# Patient Record
Sex: FEMALE | Race: WHITE | NOT HISPANIC OR LATINO | Employment: FULL TIME | URBAN - METROPOLITAN AREA
[De-identification: names, ages, dates, MRNs, and addresses within clinical notes are randomized per-mention and may not be internally consistent; named-entity substitution may affect disease eponyms.]

---

## 2018-11-13 ENCOUNTER — OFFICE VISIT (OUTPATIENT)
Dept: FAMILY MEDICINE CLINIC | Facility: CLINIC | Age: 33
End: 2018-11-13
Payer: COMMERCIAL

## 2018-11-13 VITALS
BODY MASS INDEX: 27.48 KG/M2 | HEART RATE: 81 BPM | RESPIRATION RATE: 16 BRPM | WEIGHT: 171 LBS | TEMPERATURE: 99 F | OXYGEN SATURATION: 99 % | HEIGHT: 66 IN | SYSTOLIC BLOOD PRESSURE: 118 MMHG | DIASTOLIC BLOOD PRESSURE: 78 MMHG

## 2018-11-13 DIAGNOSIS — R53.83 FATIGUE, UNSPECIFIED TYPE: ICD-10-CM

## 2018-11-13 DIAGNOSIS — R10.30 LOWER ABDOMINAL PAIN: ICD-10-CM

## 2018-11-13 DIAGNOSIS — Z00.00 ENCOUNTER FOR WELL ADULT EXAM WITHOUT ABNORMAL FINDINGS: Primary | ICD-10-CM

## 2018-11-13 PROCEDURE — 99385 PREV VISIT NEW AGE 18-39: CPT | Performed by: FAMILY MEDICINE

## 2018-11-13 NOTE — PROGRESS NOTES
Esme Ventura was seen today for new patient  Diagnoses and all orders for this visit:    Encounter for well adult exam without abnormal findings    Lower abdominal pain  -     CT abdomen pelvis w contrast; Future    Fatigue, unspecified type  -     CBC and differential; Future  -     TSH, 3rd generation with Free T4 reflex; Future  -     Comprehensive metabolic panel  -     Lipid Panel with Direct LDL reflex; Future  -     Iron; Future  -     Vitamin D 25 hydroxy; Future      Patient Counseling:  --Nutrition: Stressed importance of moderation in sodium/caffeine intake, saturated fat and cholesterol, caloric balance, sufficient intake of fresh fruits, vegetables, fiber, calcium, iron, and 1 mg of folate supplement per day   --Discussed  calcium supplement, and the daily use of baby aspirin  --Exercise: Stressed the importance of regular exercise  --Substance Abuse: Discussed cessation/primary prevention of tobacco, alcohol, or other drug use; driving or other dangerous activities under the influence; availability of treatment for abuse  --Sexuality: Discussed sexually transmitted diseases, partner selection, use of condoms, avoidance of unintended pregnancy  and contraceptive alternatives  --Injury prevention: Discussed safety belts, safety helmets, smoke detector, smoking near bedding or upholstery  --Dental health: Discussed importance of regular tooth brushing, flossing, and dental visits  --Immunizations reviewed  --Discussed benefits of screening colonoscopy    Chief Complaint   Patient presents with    new patient       HPI    Patient here for a comprehensive physical exam The patient reports no problems other than lower abdominal cramping on and off for 6 months  Fatigue and tired  Works for [employer] [address] [employer] you take any herbs or supplements that were not prescribed by a doctor? no   Are you taking calcium supplements?  no   Are you taking aspirin daily? no  How often do you exercise? Not exercising regularly   Walks at work   Diet?  2-3 servings of fruits and vegetables   Dental visit:  Every 6 months     Vision test: 6-7 years ago  No vision correction needed     Colonoscopy:  Never had one done      History:  LMP: regular periods  Last pap date: 4 years ago  Abnormal pap? no  : 1  Para: 3 1years old son         History   Sexual Activity    Sexual activity: Yes    Partners: Male             Review of Systems   Constitutional: Negative  HENT: Negative  Eyes: Negative  Respiratory: Negative  Cardiovascular: Negative  Gastrointestinal: Negative  Endocrine: Negative          +fatigue   Genitourinary: Negative  Musculoskeletal: Negative  Skin: Negative  Allergic/Immunologic: Negative  Neurological: Negative  Hematological: Negative  Psychiatric/Behavioral: Negative  History reviewed  No pertinent family history  History reviewed  No pertinent past medical history  Social History     Social History    Marital status:      Spouse name: N/A    Number of children: N/A    Years of education: N/A     Occupational History    Not on file  Social History Main Topics    Smoking status: Never Smoker    Smokeless tobacco: Never Used    Alcohol use Yes      Comment: social    Drug use: No    Sexual activity: Yes     Partners: Male     Other Topics Concern    Not on file     Social History Narrative    No narrative on file       No current outpatient prescriptions on file prior to visit  No current facility-administered medications on file prior to visit  No Known Allergies      Vitals:    18 1340   BP: 118/78   BP Location: Left arm   Patient Position: Sitting   Cuff Size: Standard   Pulse: 81   Resp: 16   Temp: 99 °F (37 2 °C)   SpO2: 99%   Weight: 77 6 kg (171 lb)   Height: 5' 5 75" (1 67 m)         Physical Exam   Constitutional: She is oriented to person, place, and time   She appears well-developed and well-nourished  HENT:   Head: Normocephalic and atraumatic  Eyes: Pupils are equal, round, and reactive to light  EOM are normal    Neck: Normal range of motion  Neck supple  Cardiovascular: Normal rate and regular rhythm  Exam reveals no friction rub  No murmur heard  Pulmonary/Chest: Effort normal and breath sounds normal    Abdominal: There is tenderness  Right lower and epigastric    Musculoskeletal: Normal range of motion  She exhibits no edema or tenderness  Neurological: She is alert and oriented to person, place, and time  Skin: Skin is warm  No rash noted  No erythema  Psychiatric: She has a normal mood and affect   Her behavior is normal

## 2021-03-17 ENCOUNTER — OFFICE VISIT (OUTPATIENT)
Dept: URGENT CARE | Facility: MEDICAL CENTER | Age: 36
End: 2021-03-17
Payer: COMMERCIAL

## 2021-03-17 VITALS — TEMPERATURE: 98.3 F | OXYGEN SATURATION: 98 % | HEART RATE: 99 BPM

## 2021-03-17 DIAGNOSIS — J06.9 ACUTE URI: Primary | ICD-10-CM

## 2021-03-17 PROCEDURE — U0003 INFECTIOUS AGENT DETECTION BY NUCLEIC ACID (DNA OR RNA); SEVERE ACUTE RESPIRATORY SYNDROME CORONAVIRUS 2 (SARS-COV-2) (CORONAVIRUS DISEASE [COVID-19]), AMPLIFIED PROBE TECHNIQUE, MAKING USE OF HIGH THROUGHPUT TECHNOLOGIES AS DESCRIBED BY CMS-2020-01-R: HCPCS | Performed by: PHYSICIAN ASSISTANT

## 2021-03-17 PROCEDURE — U0005 INFEC AGEN DETEC AMPLI PROBE: HCPCS | Performed by: PHYSICIAN ASSISTANT

## 2021-03-17 PROCEDURE — 99213 OFFICE O/P EST LOW 20 MIN: CPT | Performed by: PHYSICIAN ASSISTANT

## 2021-03-17 NOTE — LETTER
March 17, 2021     Patient: Dimas Roman   YOB: 1985   Date of Visit: 3/17/2021       To Whom it May Concern:    Dimas Roman was seen in my clinic on 3/17/2021  Please excuse until test results available    If you have any questions or concerns, please don't hesitate to call           Sincerely,          Gaurav Sharma PA-C        CC: Dimas Jessie

## 2021-03-17 NOTE — PROGRESS NOTES
[facility]        NAME: Dimas Roman is a 37 y o  female  : 1985    MRN: [id number]  DATE: 2021  TIME: 3:11 PM    Assessment and Plan   Acute URI [J06 9]  1  Acute URI  [facility]Cov[facility]      COVID-19 swab performed due to symptoms  Advised to treat symptomatically and isolate until results available  Patient Instructions     Tylenol or Motrin for pain   may use OTC cough or congestion medication   isolate until results available  Follow up with PCP in 3-5 days  Proceed to  ER if symptoms worsen  Chief Complaint     Chief Complaint   Patient presents with    Sore Throat     Started yesterday sore throat, runny nose, cough, pressure in ears, neck soreness  Took musinex  History of Present Illness        Patient is a 31-year-old female who presents today with complaints of sore throat, cough, runny nose, ear pain since yesterday  Has been using Mucinex with some improvement  No known sick exposures  No fever, chills, body aches  Review of Systems   Review of Systems   Constitutional: Negative for chills and fever  HENT: Positive for congestion, ear pain, rhinorrhea and sore throat  Respiratory: Positive for cough  Negative for shortness of breath and wheezing  Cardiovascular: Negative for chest pain  Musculoskeletal: Negative for myalgias  Current Medications     No current outpatient medications on file  Current Allergies     Allergies as of 2021    (No Known Allergies)            The following portions of the patient's history were reviewed and updated as appropriate: allergies, current medications, past family history, past medical history, past social history, past surgical history and problem list      History reviewed  No pertinent past medical history      Past Surgical History:   Procedure Laterality Date    APPENDECTOMY         No family history on file       Medications have been verified  Objective   Pulse 99   Temp 98 3 °F (36 8 °C) (Temporal)   SpO2 98%        Physical Exam     Physical Exam  Constitutional:       General: She is not in acute distress  Appearance: She is well-developed and normal weight  She is not ill-appearing  HENT:      Head: Normocephalic and atraumatic  Right Ear: Tympanic membrane and ear canal normal       Left Ear: Tympanic membrane and ear canal normal       Nose: Congestion present  No rhinorrhea  Mouth/Throat:      Mouth: Mucous membranes are moist  No oral lesions  Pharynx: Oropharynx is clear  Uvula midline  Posterior oropharyngeal erythema present  No pharyngeal swelling, oropharyngeal exudate or uvula swelling  Tonsils: No tonsillar exudate  0 on the right  0 on the left  Neck:      Musculoskeletal: Neck supple  Cardiovascular:      Rate and Rhythm: Normal rate and regular rhythm  Pulmonary:      Effort: Pulmonary effort is normal       Breath sounds: Normal breath sounds  Lymphadenopathy:      Cervical: No cervical adenopathy  Skin:     General: Skin is warm and dry  Neurological:      Mental Status: She is alert

## 2021-03-18 LAB — SARS-COV-2 RNA RESP QL NAA+PROBE: NEGATIVE

## 2021-04-05 ENCOUNTER — OFFICE VISIT (OUTPATIENT)
Dept: URGENT CARE | Facility: MEDICAL CENTER | Age: 36
End: 2021-04-05
Payer: COMMERCIAL

## 2021-04-05 ENCOUNTER — HOSPITAL ENCOUNTER (EMERGENCY)
Facility: HOSPITAL | Age: 36
Discharge: HOME | End: 2021-04-06
Attending: EMERGENCY MEDICINE | Admitting: EMERGENCY MEDICINE
Payer: COMMERCIAL

## 2021-04-05 VITALS
RESPIRATION RATE: 18 BRPM | SYSTOLIC BLOOD PRESSURE: 127 MMHG | DIASTOLIC BLOOD PRESSURE: 79 MMHG | TEMPERATURE: 99.4 F | OXYGEN SATURATION: 99 % | HEART RATE: 94 BPM

## 2021-04-05 VITALS
BODY MASS INDEX: 30.53 KG/M2 | RESPIRATION RATE: 18 BRPM | OXYGEN SATURATION: 100 % | TEMPERATURE: 101.9 F | WEIGHT: 190 LBS | HEART RATE: 78 BPM | HEIGHT: 66 IN

## 2021-04-05 DIAGNOSIS — R10.31 RIGHT LOWER QUADRANT ABDOMINAL PAIN: Primary | ICD-10-CM

## 2021-04-05 DIAGNOSIS — R50.9 FEVER, UNSPECIFIED FEVER CAUSE: ICD-10-CM

## 2021-04-05 DIAGNOSIS — R10.9 ABDOMINAL PAIN: Primary | ICD-10-CM

## 2021-04-05 LAB
ALBUMIN SERPL BCP-MCNC: 3.8 G/DL (ref 3.5–5)
ALP SERPL-CCNC: 46 U/L (ref 46–116)
ALT SERPL W P-5'-P-CCNC: 20 U/L (ref 12–78)
ANION GAP SERPL CALCULATED.3IONS-SCNC: 11 MMOL/L (ref 4–13)
APTT PPP: 32 SECONDS (ref 23–37)
AST SERPL W P-5'-P-CCNC: 9 U/L (ref 5–45)
BASOPHILS # BLD AUTO: 0.03 THOUSANDS/ΜL (ref 0–0.1)
BASOPHILS NFR BLD AUTO: 0 % (ref 0–1)
BILIRUB DIRECT SERPL-MCNC: 0.18 MG/DL (ref 0–0.2)
BILIRUB SERPL-MCNC: 0.68 MG/DL (ref 0.2–1)
BUN SERPL-MCNC: 11 MG/DL (ref 5–25)
CALCIUM SERPL-MCNC: 8.2 MG/DL (ref 8.3–10.1)
CHLORIDE SERPL-SCNC: 103 MMOL/L (ref 100–108)
CO2 SERPL-SCNC: 23 MMOL/L (ref 21–32)
CREAT SERPL-MCNC: 0.83 MG/DL (ref 0.6–1.3)
EOSINOPHIL # BLD AUTO: 0.01 THOUSAND/ΜL (ref 0–0.61)
EOSINOPHIL NFR BLD AUTO: 0 % (ref 0–6)
ERYTHROCYTE [DISTWIDTH] IN BLOOD BY AUTOMATED COUNT: 12.6 % (ref 11.6–15.1)
GFR SERPL CREATININE-BSD FRML MDRD: 93 ML/MIN/1.73SQ M
GLUCOSE SERPL-MCNC: 99 MG/DL (ref 65–140)
HCT VFR BLD AUTO: 40.5 % (ref 34.8–46.1)
HGB BLD-MCNC: 13.7 G/DL (ref 11.5–15.4)
IMM GRANULOCYTES # BLD AUTO: 0.03 THOUSAND/UL (ref 0–0.2)
IMM GRANULOCYTES NFR BLD AUTO: 0 % (ref 0–2)
INR PPP: 1.25 (ref 0.84–1.19)
LACTATE SERPL-SCNC: 0.9 MMOL/L (ref 0.5–2)
LIPASE SERPL-CCNC: 115 U/L (ref 73–393)
LYMPHOCYTES # BLD AUTO: 1.11 THOUSANDS/ΜL (ref 0.6–4.47)
LYMPHOCYTES NFR BLD AUTO: 12 % (ref 14–44)
MCH RBC QN AUTO: 30.8 PG (ref 26.8–34.3)
MCHC RBC AUTO-ENTMCNC: 33.8 G/DL (ref 31.4–37.4)
MCV RBC AUTO: 91 FL (ref 82–98)
MONOCYTES # BLD AUTO: 0.49 THOUSAND/ΜL (ref 0.17–1.22)
MONOCYTES NFR BLD AUTO: 5 % (ref 4–12)
NEUTROPHILS # BLD AUTO: 8.02 THOUSANDS/ΜL (ref 1.85–7.62)
NEUTS SEG NFR BLD AUTO: 83 % (ref 43–75)
NRBC BLD AUTO-RTO: 0 /100 WBCS
PLATELET # BLD AUTO: 187 THOUSANDS/UL (ref 149–390)
PMV BLD AUTO: 10.2 FL (ref 8.9–12.7)
POTASSIUM SERPL-SCNC: 3.8 MMOL/L (ref 3.5–5.3)
PROT SERPL-MCNC: 7.4 G/DL (ref 6.4–8.2)
PROTHROMBIN TIME: 15.1 SECONDS (ref 11.6–14.5)
RBC # BLD AUTO: 4.45 MILLION/UL (ref 3.81–5.12)
SL AMB POCT URINE HCG: NEGATIVE
SODIUM SERPL-SCNC: 137 MMOL/L (ref 136–145)
WBC # BLD AUTO: 9.69 THOUSAND/UL (ref 4.31–10.16)

## 2021-04-05 PROCEDURE — 99284 EMERGENCY DEPT VISIT MOD MDM: CPT | Performed by: PHYSICIAN ASSISTANT

## 2021-04-05 PROCEDURE — 80076 HEPATIC FUNCTION PANEL: CPT | Performed by: PHYSICIAN ASSISTANT

## 2021-04-05 PROCEDURE — 96365 THER/PROPH/DIAG IV INF INIT: CPT

## 2021-04-05 PROCEDURE — 99284 EMERGENCY DEPT VISIT MOD MDM: CPT

## 2021-04-05 PROCEDURE — 85025 COMPLETE CBC W/AUTO DIFF WBC: CPT | Performed by: PHYSICIAN ASSISTANT

## 2021-04-05 PROCEDURE — 83690 ASSAY OF LIPASE: CPT | Performed by: PHYSICIAN ASSISTANT

## 2021-04-05 PROCEDURE — 81025 URINE PREGNANCY TEST: CPT | Performed by: PHYSICIAN ASSISTANT

## 2021-04-05 PROCEDURE — 83605 ASSAY OF LACTIC ACID: CPT | Performed by: PHYSICIAN ASSISTANT

## 2021-04-05 PROCEDURE — 80048 BASIC METABOLIC PNL TOTAL CA: CPT | Performed by: PHYSICIAN ASSISTANT

## 2021-04-05 PROCEDURE — 85730 THROMBOPLASTIN TIME PARTIAL: CPT | Performed by: PHYSICIAN ASSISTANT

## 2021-04-05 PROCEDURE — 99213 OFFICE O/P EST LOW 20 MIN: CPT | Performed by: PHYSICIAN ASSISTANT

## 2021-04-05 PROCEDURE — 96375 TX/PRO/DX INJ NEW DRUG ADDON: CPT

## 2021-04-05 PROCEDURE — 85610 PROTHROMBIN TIME: CPT | Performed by: PHYSICIAN ASSISTANT

## 2021-04-05 PROCEDURE — 87040 BLOOD CULTURE FOR BACTERIA: CPT | Performed by: PHYSICIAN ASSISTANT

## 2021-04-05 PROCEDURE — 36415 COLL VENOUS BLD VENIPUNCTURE: CPT | Performed by: PHYSICIAN ASSISTANT

## 2021-04-05 RX ORDER — ONDANSETRON 2 MG/ML
4 INJECTION INTRAMUSCULAR; INTRAVENOUS ONCE
Status: COMPLETED | OUTPATIENT
Start: 2021-04-05 | End: 2021-04-05

## 2021-04-05 RX ORDER — KETOROLAC TROMETHAMINE 30 MG/ML
15 INJECTION, SOLUTION INTRAMUSCULAR; INTRAVENOUS ONCE
Status: COMPLETED | OUTPATIENT
Start: 2021-04-05 | End: 2021-04-05

## 2021-04-05 RX ORDER — LORATADINE 10 MG/1
10 TABLET ORAL DAILY
COMMUNITY

## 2021-04-05 RX ADMIN — SODIUM CHLORIDE, SODIUM LACTATE, POTASSIUM CHLORIDE, AND CALCIUM CHLORIDE 1000 ML: .6; .31; .03; .02 INJECTION, SOLUTION INTRAVENOUS at 23:39

## 2021-04-05 RX ADMIN — KETOROLAC TROMETHAMINE 15 MG: 30 INJECTION, SOLUTION INTRAMUSCULAR at 23:39

## 2021-04-05 RX ADMIN — ONDANSETRON 4 MG: 2 INJECTION INTRAMUSCULAR; INTRAVENOUS at 23:39

## 2021-04-05 NOTE — Clinical Note
Edilma Justice was seen and treated in our emergency department on 4/5/2021  Diagnosis: Abdominal pain    Marco Mcmanus  may return to work on return date  She may return on this date: 04/07/2021         If you have any questions or concerns, please don't hesitate to call        Roxy García PA-C    ______________________________           _______________          _______________  Hospital Representative                              Date                                Time

## 2021-04-05 NOTE — PROGRESS NOTES
[facility]        NAME: Santosh Spivey is a 37 y o  female  : 1985    MRN: [id number]  DATE: 2021  TIME: 7:32 PM    Assessment and Plan   Right lower quadrant abdominal pain [R10 31]  1  Right lower quadrant abdominal pain  POCT urine HCG    Transfer to other facility   2  Fever, unspecified fever cause  Transfer to other facility       Concern for pelvic versus intra-abdominal process  She did have appendix removed in the past   Pregnancy test negative  Will send to ED for further evaluation and workup  Patient prefers to go to [facility]  Patient Instructions       Sent to [facility] ED for further evaluation and workup    Chief Complaint     Chief Complaint   Patient presents with    Abdominal Pain     Started yesterday with abd pain that comes and goes in severity  Loose stools   Fever     Highest recorded temp 101 8F    Generalized Body Aches     Joint Pain         History of Present Illness        Patient is a 45-year-old female who presents today with complaints of lower abdominal pain and fever that started last night  Patient reports pain in her hips as well  She has had  Appendix removed in the past   Still has uterus, tubes, ovaries  Fever has been as high as 101 8  Patient was fatigued today and did not have much of an appetite  Has only mostly drinks water  No known sick contacts  Mostly works from home  She denies any nausea, vomiting, diarrhea  Stools are soft but not loose  No blood in her stools  She denies any urinary symptoms or back or flank pain  Review of Systems   Review of Systems   Constitutional: Positive for appetite change, fatigue and fever  Negative for chills  Respiratory: Negative for shortness of breath  Cardiovascular: Negative for chest pain  Gastrointestinal: Positive for abdominal pain  Negative for blood in stool, diarrhea, nausea and vomiting     Genitourinary: Negative for difficulty urinating, flank pain and vaginal bleeding  Musculoskeletal: Positive for arthralgias  Negative for back pain  Skin: Negative for color change  Current Medications       Current Outpatient Medications:     loratadine (CLARITIN) 10 mg tablet, Take 10 mg by mouth daily, Disp: , Rfl:     Current Allergies     Allergies as of 04/05/2021    (No Known Allergies)            The following portions of the patient's history were reviewed and updated as appropriate: allergies, current medications, past family history, past medical history, past social history, past surgical history and problem list      Past Medical History:   Diagnosis Date    Allergic        Past Surgical History:   Procedure Laterality Date    APPENDECTOMY  2006       Family History   Problem Relation Age of Onset    No Known Problems Mother     No Known Problems Father          Medications have been verified  Objective   Pulse 78   Temp (!) 101 9 °F (38 8 °C)   Resp 18   Ht 5' 6" (1 676 m)   Wt 86 2 kg (190 lb)   LMP 03/17/2021   SpO2 100%   BMI 30 67 kg/m²        Physical Exam     Physical Exam  Constitutional:       General: She is not in acute distress  Appearance: She is well-developed and normal weight  She is ill-appearing  HENT:      Head: Normocephalic and atraumatic  Mouth/Throat:      Mouth: Mucous membranes are moist       Pharynx: Oropharynx is clear  Eyes:      Extraocular Movements: Extraocular movements intact  Pupils: Pupils are equal, round, and reactive to light  Cardiovascular:      Rate and Rhythm: Normal rate and regular rhythm  Pulmonary:      Effort: Pulmonary effort is normal       Breath sounds: Normal breath sounds  Abdominal:      General: Abdomen is flat  There is no distension  Palpations: Abdomen is soft  Tenderness: There is abdominal tenderness in the right lower quadrant and periumbilical area  There is guarding and rebound  There is no right CVA tenderness or left CVA tenderness  Hernia: No hernia is present  Comments:   Patient is very tender in the right lower quadrant/ pelvic area  Patient has guarding and rebound, there is some mild tenderness in the periumbilical area as well   Skin:     General: Skin is warm and dry  Neurological:      Mental Status: She is alert

## 2021-04-06 ENCOUNTER — APPOINTMENT (EMERGENCY)
Dept: CT IMAGING | Facility: HOSPITAL | Age: 36
End: 2021-04-06
Payer: COMMERCIAL

## 2021-04-06 LAB
BILIRUB UR QL STRIP: NEGATIVE
CLARITY UR: CLEAR
COLOR UR: YELLOW
EXT PREG TEST URINE: NEGATIVE
EXT. CONTROL ED NAV: NORMAL
FLUAV RNA RESP QL NAA+PROBE: NEGATIVE
FLUBV RNA RESP QL NAA+PROBE: NEGATIVE
GLUCOSE UR STRIP-MCNC: NEGATIVE MG/DL
HGB UR QL STRIP.AUTO: NEGATIVE
KETONES UR STRIP-MCNC: ABNORMAL MG/DL
LEUKOCYTE ESTERASE UR QL STRIP: NEGATIVE
NITRITE UR QL STRIP: NEGATIVE
PH UR STRIP.AUTO: 5.5 [PH]
PROT UR STRIP-MCNC: NEGATIVE MG/DL
RSV RNA RESP QL NAA+PROBE: NEGATIVE
SARS-COV-2 RNA RESP QL NAA+PROBE: NEGATIVE
SP GR UR STRIP.AUTO: 1.01 (ref 1–1.03)
UROBILINOGEN UR QL STRIP.AUTO: 0.2 E.U./DL

## 2021-04-06 PROCEDURE — 36415 COLL VENOUS BLD VENIPUNCTURE: CPT | Performed by: PHYSICIAN ASSISTANT

## 2021-04-06 PROCEDURE — 81003 URINALYSIS AUTO W/O SCOPE: CPT | Performed by: PHYSICIAN ASSISTANT

## 2021-04-06 PROCEDURE — 74177 CT ABD & PELVIS W/CONTRAST: CPT

## 2021-04-06 PROCEDURE — 87040 BLOOD CULTURE FOR BACTERIA: CPT | Performed by: PHYSICIAN ASSISTANT

## 2021-04-06 PROCEDURE — 0241U HB NFCT DS VIR RESP RNA 4 TRGT: CPT | Performed by: PHYSICIAN ASSISTANT

## 2021-04-06 RX ORDER — DICYCLOMINE HCL 20 MG
20 TABLET ORAL 2 TIMES DAILY
Qty: 20 TABLET | Refills: 0 | Status: SHIPPED | OUTPATIENT
Start: 2021-04-06 | End: 2021-08-16

## 2021-04-06 RX ORDER — DICYCLOMINE HCL 20 MG
20 TABLET ORAL ONCE
Status: COMPLETED | OUTPATIENT
Start: 2021-04-06 | End: 2021-04-06

## 2021-04-06 RX ORDER — AMOXICILLIN AND CLAVULANATE POTASSIUM 875; 125 MG/1; MG/1
1 TABLET, FILM COATED ORAL EVERY 12 HOURS SCHEDULED
Qty: 14 TABLET | Refills: 0 | Status: SHIPPED | OUTPATIENT
Start: 2021-04-06 | End: 2021-04-13

## 2021-04-06 RX ORDER — AMOXICILLIN AND CLAVULANATE POTASSIUM 875; 125 MG/1; MG/1
1 TABLET, FILM COATED ORAL ONCE
Status: COMPLETED | OUTPATIENT
Start: 2021-04-06 | End: 2021-04-06

## 2021-04-06 RX ADMIN — DICYCLOMINE HYDROCHLORIDE 20 MG: 20 TABLET ORAL at 01:51

## 2021-04-06 RX ADMIN — AMOXICILLIN AND CLAVULANATE POTASSIUM 1 TABLET: 875; 125 TABLET, FILM COATED ORAL at 01:51

## 2021-04-06 RX ADMIN — IOHEXOL 100 ML: 350 INJECTION, SOLUTION INTRAVENOUS at 00:33

## 2021-04-06 NOTE — ED PROVIDER NOTES
History  Chief Complaint   Patient presents with    Abdominal Pain     RLQ pain with low grade fever since last night      Jo Cline is a 81-year-old female arriving ambulatory to the emergency department for evaluation of generalized abdominal pain beginning yesterday and acutely worsening today  The patient additionally reports developing fever today with T-max 101 8°F oral   She describes a generalized cramping sensation predominantly throughout the mid and lower abdomen  She had been previously evaluated for these complaints this evening at a nearby urgent care facility at which time she was found have generalized abdominal tenderness to palpation with guarding especially in the right lower quadrant and had been referred to the emergency department for further evaluation  Past surgical history is notable for appendectomy and  section x1  The patient states that she did not take any antipyretic medication today and was not found to be febrile on hospital arrival   The patient states that she is urinating well and has been tolerating po intake  She denies nausea or episodes of vomiting, diarrhea or constipation  She has not noticed any blood in her stool  The patient works from home, denying any sick contact or recent illness  She denies recent travel use, recent antibiotics, or suspicious food intake  She denies back pain or flank pain  She denies abnormal vaginal bleeding or discharge, hx PID, hx STIs or concern for STIs        History provided by:  Patient  Abdominal Pain  Pain location:  Generalized  Pain quality: cramping and sharp    Pain radiates to:  Does not radiate  Pain severity:  Moderate  Onset quality:  Gradual  Duration:  1 day  Timing:  Constant  Chronicity:  New  Context: not retching, not sick contacts and not suspicious food intake    Worsened by:  Nothing  Ineffective treatments:  None tried  Associated symptoms: fever    Associated symptoms: no chills, no constipation, no cough, no diarrhea, no dysuria, no hematuria, no melena, no shortness of breath, no vaginal bleeding, no vaginal discharge and no vomiting    Risk factors: has not had multiple surgeries and not pregnant        Prior to Admission Medications   Prescriptions Last Dose Informant Patient Reported? Taking?   loratadine (CLARITIN) 10 mg tablet  Self Yes No   Sig: Take 10 mg by mouth daily      Facility-Administered Medications: None       Past Medical History:   Diagnosis Date    Allergic        Past Surgical History:   Procedure Laterality Date    APPENDECTOMY  2006       Family History   Problem Relation Age of Onset    No Known Problems Mother     No Known Problems Father      I have reviewed and agree with the history as documented  E-Cigarette/Vaping     E-Cigarette/Vaping Substances     Social History     Tobacco Use    Smoking status: Never Smoker    Smokeless tobacco: Never Used   Substance Use Topics    Alcohol use: Yes     Comment: social    Drug use: No       Review of Systems   Constitutional: Positive for fever  Negative for chills  Respiratory: Negative for cough and shortness of breath  Gastrointestinal: Positive for abdominal pain  Negative for blood in stool, constipation, diarrhea, melena and vomiting  Genitourinary: Negative for dysuria, frequency, hematuria, urgency, vaginal bleeding and vaginal discharge  All other systems reviewed and are negative  Physical Exam  Physical Exam  Vitals signs and nursing note reviewed  Constitutional:       General: She is not in acute distress  Appearance: She is well-developed  She is not ill-appearing, toxic-appearing or diaphoretic  HENT:      Head: Normocephalic and atraumatic  Eyes:      Conjunctiva/sclera: Conjunctivae normal       Pupils: Pupils are equal, round, and reactive to light  Neck:      Musculoskeletal: Normal range of motion and neck supple     Cardiovascular:      Rate and Rhythm: Normal rate and regular rhythm  Heart sounds: Normal heart sounds  Pulmonary:      Effort: Pulmonary effort is normal  No respiratory distress  Breath sounds: Normal breath sounds  No wheezing  Abdominal:      General: Bowel sounds are normal  There is no distension  Palpations: Abdomen is soft  Tenderness: There is generalized abdominal tenderness  There is no rebound  Negative signs include Ortiz's sign  Comments: Abdomen soft, nondistended  Generalized abdominal tenderness to palpation; no rigidity or peritoneal signs   Musculoskeletal: Normal range of motion  General: No tenderness  Skin:     General: Skin is warm and dry  Capillary Refill: Capillary refill takes less than 2 seconds  Coloration: Skin is not cyanotic, mottled or pale  Findings: No erythema or rash  Neurological:      General: No focal deficit present  Mental Status: She is alert  Mental status is at baseline  Sensory: Sensation is intact  No sensory deficit  Motor: Motor function is intact  No weakness           Vital Signs  ED Triage Vitals [04/05/21 2001]   Temperature Pulse Respirations Blood Pressure SpO2   99 4 °F (37 4 °C) (!) 112 20 122/67 99 %      Temp Source Heart Rate Source Patient Position - Orthostatic VS BP Location FiO2 (%)   Oral Monitor Sitting Left arm --      Pain Score       7           Vitals:    04/05/21 2001 04/05/21 2309   BP: 122/67 127/79   Pulse: (!) 112 94   Patient Position - Orthostatic VS: Sitting Sitting         Visual Acuity      ED Medications  Medications   lactated ringers bolus 1,000 mL (0 mL Intravenous Stopped 4/6/21 0039)   ketorolac (TORADOL) injection 15 mg (15 mg Intravenous Given 4/5/21 2339)   ondansetron (ZOFRAN) injection 4 mg (4 mg Intravenous Given 4/5/21 2339)   iohexol (OMNIPAQUE) 350 MG/ML injection (SINGLE-DOSE) 100 mL (100 mL Intravenous Given 4/6/21 0033)   dicyclomine (BENTYL) tablet 20 mg (20 mg Oral Given 4/6/21 0151) amoxicillin-clavulanate (AUGMENTIN) 875-125 mg per tablet 1 tablet (1 tablet Oral Given 4/6/21 0151)           Diagnostic Studies  Results Reviewed     Procedure Component Value Units Date/Time    Blood culture #1 [[id number]] Collected: 04/05/21 2323    Lab Status: Preliminary result Specimen: Blood from Arm, Right Updated: 04/07/21 0901     Blood Culture No Growth at 24 hrs  Blood culture #2 [[id number]] Collected: 04/06/21 0055    Lab Status: Preliminary result Specimen: Blood from Arm, Left Updated: 04/07/21 0901     Blood Culture No Growth at 24 hrs  COVID19, Influenza A/B, RSV PCR, [facility] [[id number]]  (Normal) Collected: 04/06/21 0104    Lab Status: Final result Specimen: Nares from Nasopharyngeal Swab Updated: 04/06/21 0146     SARS-CoV-2 Negative     INFLUENZA A PCR Negative     INFLUENZA B PCR Negative     RSV PCR Negative    Narrative: This test has been authorized by FDA under an EUA (Emergency Use Assay) for use by authorized laboratories  Clinical caution and judgement should be used with the interpretation of these results with consideration of the clinical impression and other laboratory testing  Testing reported as "Positive" or "Negative" has been proven to be accurate according to standard laboratory validation requirements  All testing is performed with control materials showing appropriate reactivity at standard intervals      POCT pregnancy, urine [[id number]]  (Normal) Resulted: 04/06/21 0132    Lab Status: Final result Updated: 04/06/21 0132     EXT PREG TEST UR (Ref: Negative) negative     Control valid    UA w Reflex to Microscopic w Reflex to Culture [[id number]]  (Abnormal) Collected: 04/06/21 0107    Lab Status: Final result Specimen: Urine, Clean Catch Updated: 04/06/21 0116     Color, UA Yellow     Clarity, UA Clear     Specific Gravity, UA 1 010     pH, UA 5 5     Leukocytes, UA Negative     Nitrite, UA Negative     Protein, UA Negative mg/dl      Glucose, UA Negative mg/dl Ketones, UA 40 (2+) mg/dl      Urobilinogen, UA 0 2 E U /dl      Bilirubin, UA Negative     Blood, UA Negative    Lactic acid [[id number]]  (Normal) Collected: 04/05/21 2323    Lab Status: Final result Specimen: Blood from Arm, Right Updated: 04/05/21 2353     LACTIC ACID 0 9 mmol/L     Narrative:      Result may be elevated if tourniquet was used during collection      Basic metabolic panel [[id number]]  (Abnormal) Collected: 04/05/21 2323    Lab Status: Final result Specimen: Blood from Arm, Right Updated: 04/05/21 2348     Sodium 137 mmol/L      Potassium 3 8 mmol/L      Chloride 103 mmol/L      CO2 23 mmol/L      ANION GAP 11 mmol/L      BUN 11 mg/dL      Creatinine 0 83 mg/dL      Glucose 99 mg/dL      Calcium 8 2 mg/dL      eGFR 93 ml/min/1 73sq m     Narrative:      [place] guidelines for Chronic Kidney Disease (CKD):     Stage 1 with normal or high GFR (GFR > 90 mL/min/1 73 square meters)    Stage 2 Mild CKD (GFR = 60-89 mL/min/1 73 square meters)    Stage 3A Moderate CKD (GFR = 45-59 mL/min/1 73 square meters)    Stage 3B Moderate CKD (GFR = 30-44 mL/min/1 73 square meters)    Stage 4 Severe CKD (GFR = 15-29 mL/min/1 73 square meters)    Stage 5 End Stage CKD (GFR <15 mL/min/1 73 square meters)  Note: GFR calculation is accurate only with a steady state creatinine    Hepatic function panel [[id number]]  (Normal) Collected: 04/05/21 2323    Lab Status: Final result Specimen: Blood from Arm, Right Updated: 04/05/21 2348     Total Bilirubin 0 68 mg/dL      Bilirubin, Direct 0 18 mg/dL      Alkaline Phosphatase 46 U/L      AST 9 U/L      ALT 20 U/L      Total Protein 7 4 g/dL      Albumin 3 8 g/dL     Lipase [[id number]]  (Normal) Collected: 04/05/21 2323    Lab Status: Final result Specimen: Blood from Arm, Right Updated: 04/05/21 2348     Lipase 115 u/L     Protime-INR [[id number]]  (Abnormal) Collected: 04/05/21 2323    Lab Status: Final result Specimen: Blood from Arm, Right Updated: 04/05/21 2346     Protime 15 1 seconds      INR 1 25    APTT [[id number]]  (Normal) Collected: 04/05/21 2323    Lab Status: Final result Specimen: Blood from Arm, Right Updated: 04/05/21 2346     PTT 32 seconds     CBC and differential [[id number]]  (Abnormal) Collected: 04/05/21 2323    Lab Status: Final result Specimen: Blood from Arm, Right Updated: 04/05/21 2330     WBC 9 69 Thousand/uL      RBC 4 45 Million/uL      Hemoglobin 13 7 g/dL      Hematocrit 40 5 %      MCV 91 fL      MCH 30 8 pg      MCHC 33 8 g/dL      RDW 12 6 %      MPV 10 2 fL      Platelets 035 Thousands/uL      nRBC 0 /100 WBCs      Neutrophils Relative 83 %      Immat GRANS % 0 %      Lymphocytes Relative 12 %      Monocytes Relative 5 %      Eosinophils Relative 0 %      Basophils Relative 0 %      Neutrophils Absolute 8 02 Thousands/µL      Immature Grans Absolute 0 03 Thousand/uL      Lymphocytes Absolute 1 11 Thousands/µL      Monocytes Absolute 0 49 Thousand/µL      Eosinophils Absolute 0 01 Thousand/µL      Basophils Absolute 0 03 Thousands/µL                  CT abdomen pelvis with contrast   Final Result by Maciej Francois MD (04/06 0112)      Multiple loops of fluid-filled small bowel throughout the abdomen and pelvis with a small amount of liquid stool within the colon  A few loops of small bowel within the left mid to lower abdomen demonstrates mild wall thickening  The findings are    suspicious for an infectious or inflammatory enteritis  Small amount of pelvic free fluid  No free intraperitoneal air  No large or small bowel obstruction  Workstation performed: [id number]                    Procedures  Procedures         ED Course                             SBIRT 22yo+      Most Recent Value   SBIRT (23 yo +)   In order to provide better care to our patients, we are screening all of our patients for alcohol and drug use  Would it be okay to ask you these screening questions?   Yes Filed at: 2021 005   Initial Alcohol Screen: US AUDIT-C    1  How often do you have a drink containing alcohol?  0 Filed at: 2021   2  How many drinks containing alcohol do you have on a typical day you are drinking? 0 Filed at: 2021   3b  FEMALE Any Age, or MALE 65+: How often do you have 4 or more drinks on one occassion? 0 Filed at: 2021   Audit-C Score  0 Filed at: 2021 5622   VERENICE: How many times in the past year have you    Used an illegal drug or used a prescription medication for non-medical reasons? Never Filed at: 2021 9460                    MDM  Number of Diagnoses or Management Options  Abdominal pain: new and requires workup  Diagnosis management comments: This is a 32yo female arriving ambulatory to the emergency department for evaluation of generalized abdominal pain and fever  Symptom onset yesterday though worsening today  Patient reports gradual development of generalized abdominal pain described as cramping in addition to fevers with T-max 101 8° F oral today  Patient had been previously evaluated for these symptoms at an urgent care center and was found have abdominal tenderness on exam predominantly in the right lower quadrant and had been referred to the emergency department for further evaluation  Past surgical history notable for appendectomy and  section x1  Patient denies nausea, vomiting, diarrhea, constipation, rectal bleeding, abnormal vaginal bleeding or discharge  She denies recent illness or sick contact  Denies recent abx use, recent travel, suspicious food intake      Differential diagnosis includes but not limited to: gastritis, gerd, pud, pancreatitis, hepatitis, acute cholecystitis, choledocholithiasis, biliary disease, enteritis, colitis, diverticulitis, obstruction, ovarian cyst, PID, UTI, pyelo; examination not clinically concerning for ovarian torsion    Initial ED plan: abdominal labs, CT abd/pelvis, UA    Final ED Assessment: Vital signs on hospital arrival notable for low grade temp 99 4F,  bpm; patient provided IV fluids, Toradol, Zofran  Examination as documented above  All labs and imaging independently reviewed with imaging interpreted by the radiologist   There were no significant lab findings, no leukocytosis, lactate within normal limits  No evidence of renal impairment or electrolyte derangement  UA without evidence of urinary tract infection  COVID testing negative  CT abdomen/pelvis reports findings suspicious for infectious or inflammatory enteritis, no large or small bowel obstruction  In setting of fever, will treat with antibiotic course Augmentin, first dose given in the ED  The patient had been updated of all lab and imaging findings, plan for hospital discharge with scripts for augmentin and bentyl, and close return precautions  I had advised the patient to follow-up with her primary care provider upon completion of antibiotic course for reassessment  Advised adequate hydration and tylenol/ibuprofen as needed for fevers  She was encouraged to return immediately with any new or worsening symptoms including but not limited to severe abdominal pain, intractable nausea or vomiting, inability to tolerate p o  Intake, or refractory fever despite antibiotic use  The patient had verbalized understanding and is agreeable with disposition plan  While being monitored in the emergency department, the patient had remained hemodynamically stable, without new or worsening symptoms  She did not experience any vomiting or episodes of diarrhea in the ED  She is stable for discharge home at this time           Amount and/or Complexity of Data Reviewed  Clinical lab tests: ordered and reviewed  Tests in the radiology section of CPT®: ordered and reviewed  Review and summarize past medical records: yes  Independent visualization of images, tracings, or specimens: yes    Risk of Complications, Morbidity, and/or Mortality  Presenting problems: moderate  Diagnostic procedures: moderate  Management options: low    Patient Progress  Patient progress: stable      Disposition  Final diagnoses:   Abdominal pain     Time reflects when diagnosis was documented in both MDM as applicable and the Disposition within this note     Time User Action Codes Description Comment    4/6/2021  1:43 AM Alberto Huizar Add [R10 9] Abdominal pain       ED Disposition     ED Disposition Condition Date/Time Comment    Discharge Stable Tue Apr 6, 2021  1:43 AM Mabelene Levels discharge to home/self care  Follow-up Information     Follow up With Specialties Details Why Contact Info Additional Information    Your Primary Care Provider   Please follow up with your PCP      [address] [place] [address] Emergency Department Emergency Medicine  If symptoms worsen [address] [place] [address] Emergency Department, [address] [place] [address], [facility]          Discharge Medication List as of 4/6/2021  1:50 AM      START taking these medications    Details   amoxicillin-clavulanate (AUGMENTIN) 875-125 mg per tablet Take 1 tablet by mouth every 12 (twelve) hours for 7 days, Starting Tue 4/6/2021, Until Tue 4/13/2021, Normal      dicyclomine (BENTYL) 20 mg tablet Take 1 tablet (20 mg total) by mouth 2 (two) times a day, Starting Tue 4/6/2021, Normal         CONTINUE these medications which have NOT CHANGED    Details   loratadine (CLARITIN) 10 mg tablet Take 10 mg by mouth daily, Historical Med           No discharge procedures on file      PDMP Review     None          ED Provider  Electronically Signed by           Mee Miramontes PA-C  04/08/21 0028

## 2021-04-06 NOTE — DISCHARGE INSTRUCTIONS
Encourage adequate hydration and rest  Please take antibiotic course as directed  Return immediately to the emergency department if you experience any new or worsening symptoms

## 2021-04-11 LAB
BACTERIA BLD CULT: NORMAL
BACTERIA BLD CULT: NORMAL

## 2021-05-03 ENCOUNTER — IMMUNIZATIONS (OUTPATIENT)
Dept: FAMILY MEDICINE CLINIC | Facility: HOSPITAL | Age: 36
End: 2021-05-03

## 2021-05-03 DIAGNOSIS — Z23 ENCOUNTER FOR IMMUNIZATION: Primary | ICD-10-CM

## 2021-05-03 PROCEDURE — 0001A SARS-COV-2 / COVID-19 MRNA VACCINE (PFIZER-BIONTECH) 30 MCG: CPT

## 2021-05-03 PROCEDURE — 91300 SARS-COV-2 / COVID-19 MRNA VACCINE (PFIZER-BIONTECH) 30 MCG: CPT

## 2021-05-24 ENCOUNTER — IMMUNIZATIONS (OUTPATIENT)
Dept: FAMILY MEDICINE CLINIC | Facility: HOSPITAL | Age: 36
End: 2021-05-24

## 2021-05-24 DIAGNOSIS — Z23 ENCOUNTER FOR IMMUNIZATION: Primary | ICD-10-CM

## 2021-05-24 PROCEDURE — 0002A SARS-COV-2 / COVID-19 MRNA VACCINE (PFIZER-BIONTECH) 30 MCG: CPT

## 2021-05-24 PROCEDURE — 91300 SARS-COV-2 / COVID-19 MRNA VACCINE (PFIZER-BIONTECH) 30 MCG: CPT

## 2021-08-16 ENCOUNTER — OFFICE VISIT (OUTPATIENT)
Dept: FAMILY MEDICINE CLINIC | Facility: CLINIC | Age: 36
End: 2021-08-16
Payer: COMMERCIAL

## 2021-08-16 VITALS
OXYGEN SATURATION: 98 % | HEIGHT: 66 IN | BODY MASS INDEX: 30.22 KG/M2 | HEART RATE: 80 BPM | WEIGHT: 188 LBS | SYSTOLIC BLOOD PRESSURE: 128 MMHG | DIASTOLIC BLOOD PRESSURE: 86 MMHG | TEMPERATURE: 97.8 F

## 2021-08-16 DIAGNOSIS — R22.0 LOCALIZED SWELLING, MASS AND LUMP, HEAD: ICD-10-CM

## 2021-08-16 DIAGNOSIS — Z11.59 ENCOUNTER FOR HEPATITIS C SCREENING TEST FOR LOW RISK PATIENT: ICD-10-CM

## 2021-08-16 DIAGNOSIS — Z83.2 FAMILY HISTORY OF AUTOIMMUNE DISORDER: ICD-10-CM

## 2021-08-16 DIAGNOSIS — Z83.49 FAMILY HISTORY OF THYROID DISEASE: ICD-10-CM

## 2021-08-16 DIAGNOSIS — Z13.220 SCREENING, LIPID: ICD-10-CM

## 2021-08-16 DIAGNOSIS — R53.83 FATIGUE, UNSPECIFIED TYPE: ICD-10-CM

## 2021-08-16 DIAGNOSIS — Z11.4 SCREENING FOR HIV (HUMAN IMMUNODEFICIENCY VIRUS): ICD-10-CM

## 2021-08-16 DIAGNOSIS — Z12.4 SCREENING FOR CERVICAL CANCER: ICD-10-CM

## 2021-08-16 DIAGNOSIS — Z00.00 HEALTHCARE MAINTENANCE: Primary | ICD-10-CM

## 2021-08-16 DIAGNOSIS — Z13.1 SCREENING FOR DIABETES MELLITUS: ICD-10-CM

## 2021-08-16 DIAGNOSIS — R20.2 TINGLING IN EXTREMITIES: ICD-10-CM

## 2021-08-16 PROCEDURE — 99385 PREV VISIT NEW AGE 18-39: CPT | Performed by: FAMILY MEDICINE

## 2021-08-16 NOTE — PROGRESS NOTES
Morgan Expose 1985 female MRN: [id number]      ASSESSMENT/PLAN  Problem List Items Addressed This Visit     None      Visit Diagnoses     Healthcare maintenance    -  Primary    Screening for cervical cancer        Relevant Orders    Ambulatory referral to Obstetrics / Gynecology    Screening for diabetes mellitus        Relevant Orders    Comprehensive metabolic panel    Screening, lipid        Relevant Orders    Lipid panel    Family history of thyroid disease        Relevant Orders    TSH, 3rd generation with Free T4 reflex    Family history of autoimmune disorder        Relevant Orders    CBC and differential    Tingling in extremities        Relevant Orders    CBC and differential    TSH, 3rd generation with Free T4 reflex    Lyme Antibody Profile with reflex to WB    Fatigue, unspecified type        Relevant Orders    CBC and differential    TSH, 3rd generation with Free T4 reflex    Lyme Antibody Profile with reflex to WB    Screening for HIV (human immunodeficiency virus)        Relevant Orders    HIV 1/2 Antigen/Antibody (4th Generation) w Reflex [facility]    Encounter for hepatitis C screening test for low risk patient        Relevant Orders    Hepatitis C antibody    Localized swelling, mass and lump, head        Relevant Orders    US head neck soft tissue        BP WNL on recheck   BMI as below   CMP + Lipids to screen for HLD, DM   CBC, TSH, Lyme to eval fatigue, pins/needles  Pap DUE -- refer to GYN  Encouraged regular dental and eye exams      Lump on head likely lymph node vs lipoma -- given change in size, will US to further evaluate     BMI Counseling: Body mass index is 30 34 kg/m²  The BMI is above normal  Nutrition recommendations include 3-5 servings of fruits/vegetables daily  Exercise recommendations include exercising 3-5 times per week  No future appointments         SUBJECTIVE  CC: Establish Care and Lump on head (On right side of head, has had for some time but has changed shape)      HPI:  Virl Sandifer is a 37 y o  female who presents to establish care  History reviewed and updated as below  Lump on L side of head -- has been present for years   Not painful   Within the past year, has changed shape -- wider  Firm, non-mobile     Review of Systems   Constitutional: Positive for fatigue  Negative for unexpected weight change  HENT: Negative for congestion, ear pain, rhinorrhea and sore throat  Eyes: Negative for visual disturbance  Respiratory: Negative for cough and shortness of breath  Cardiovascular: Negative for chest pain (every once in awhile when she turns a certain way), palpitations (every now and again feels a skip "pumps weird") and leg swelling  Gastrointestinal: Positive for abdominal pain (intermittent -- in ED in 2021 -- can't figure out what foods trigger it)  Negative for constipation and diarrhea  Endocrine: Negative for polyuria  Genitourinary: Negative for dysuria and menstrual problem  Musculoskeletal:        Lump on back of head   Neurological: Negative for dizziness and headaches  Moments throughout the day when she feels fatigued and "tingly" and "clumsy" (has difficulty doing things) -- lasts for a few hours and then is fine the next day; not even once per week   Psychiatric/Behavioral: Negative for sleep disturbance         Historical Information   The patient history was reviewed and updated as follows:    Past Medical History:   Diagnosis Date    Allergic      Past Surgical History:   Procedure Laterality Date    APPENDECTOMY  2006     SECTION      WISDOM TOOTH EXTRACTION       Family History   Problem Relation Age of Onset    No Known Problems Mother     No Known Problems Father     Autoimmune disease Family     Thyroid disease Family       Social History   Social History     Substance and Sexual Activity   Alcohol Use Yes    Comment: 1x/week     Social History     Substance and Sexual Activity   Drug Use No     Social History     Tobacco Use   Smoking Status Never Smoker   Smokeless Tobacco Never Used       Medications:     Current Outpatient Medications:     loratadine (CLARITIN) 10 mg tablet, Take 10 mg by mouth daily, Disp: , Rfl:   No Known Allergies    OBJECTIVE    Vitals:   Vitals:    08/16/21 1430 08/16/21 1451   BP: 141/100 128/86   Pulse: 80    Temp: 97 8 °F (36 6 °C)    SpO2: 98%    Weight: 85 3 kg (188 lb)    Height: 5' 6" (1 676 m)            Physical Exam  Vitals and nursing note reviewed  Constitutional:       General: She is not in acute distress  Appearance: Normal appearance  HENT:      Head: Normocephalic and atraumatic  Comments: Approximately 1 5 cm distinct mass, firm, semi-mobile, non-tender to palpation with no overlying skin changes      Right Ear: Tympanic membrane and ear canal normal       Left Ear: Tympanic membrane and ear canal normal       Nose: Nose normal       Mouth/Throat:      Mouth: Mucous membranes are moist       Pharynx: No oropharyngeal exudate or posterior oropharyngeal erythema  Eyes:      Conjunctiva/sclera: Conjunctivae normal    Cardiovascular:      Rate and Rhythm: Normal rate and regular rhythm  Pulmonary:      Effort: Pulmonary effort is normal  No respiratory distress  Breath sounds: Normal breath sounds  Abdominal:      General: Bowel sounds are normal  There is no distension  Palpations: Abdomen is soft  Tenderness: There is no abdominal tenderness  Musculoskeletal:      Right lower leg: No edema  Left lower leg: No edema  Lymphadenopathy:      Cervical: No cervical adenopathy  Skin:     General: Skin is warm and dry  Neurological:      General: No focal deficit present  Mental Status: She is alert     Psychiatric:         Mood and Affect: Mood normal                     DO Rory Rasheed [address] [facility]   8/16/2021  2:52 PM

## 2022-04-12 ENCOUNTER — OFFICE VISIT (OUTPATIENT)
Dept: OBGYN CLINIC | Facility: CLINIC | Age: 37
End: 2022-04-12
Payer: COMMERCIAL

## 2022-04-12 VITALS
DIASTOLIC BLOOD PRESSURE: 64 MMHG | HEIGHT: 66 IN | WEIGHT: 192 LBS | SYSTOLIC BLOOD PRESSURE: 124 MMHG | BODY MASS INDEX: 30.86 KG/M2

## 2022-04-12 DIAGNOSIS — Z11.51 SCREENING FOR HUMAN PAPILLOMAVIRUS (HPV): ICD-10-CM

## 2022-04-12 DIAGNOSIS — Z01.419 WELL WOMAN EXAM WITH ROUTINE GYNECOLOGICAL EXAM: Primary | ICD-10-CM

## 2022-04-12 DIAGNOSIS — Z12.4 SCREENING FOR MALIGNANT NEOPLASM OF CERVIX: ICD-10-CM

## 2022-04-12 PROCEDURE — S0610 ANNUAL GYNECOLOGICAL EXAMINA: HCPCS | Performed by: OBSTETRICS & GYNECOLOGY

## 2022-04-12 PROCEDURE — G0124 SCREEN C/V THIN LAYER BY MD: HCPCS | Performed by: PATHOLOGY

## 2022-04-12 PROCEDURE — G0145 SCR C/V CYTO,THINLAYER,RESCR: HCPCS | Performed by: PATHOLOGY

## 2022-04-12 PROCEDURE — G0476 HPV COMBO ASSAY CA SCREEN: HCPCS | Performed by: OBSTETRICS & GYNECOLOGY

## 2022-04-12 NOTE — PROGRESS NOTES
ASSESSMENT & PLAN:   Diagnoses and all orders for this visit:    Well woman exam with routine gynecological exam  -     Liquid-based pap, screening    Screening for malignant neoplasm of cervix  -     Liquid-based pap, screening    Screening for human papillomavirus (HPV)  -     Liquid-based pap, screening          The following were reviewed in today's visit: ASCCP guidelines, Gardisil vaccination, STD testing breast self exam, STD testing, family planning choices, exercise and healthy diet  Patient to return to office in yearly for annual exam      All questions have been answered to her satisfaction  CC:  Annual Gynecologic Examination  Chief Complaint   Patient presents with    Gynecologic Exam     No pap on file? Mammo/Colonoscopy/Dexa not indicated   Establish Care       HPI: Al Landau is a 31 y o   who presents for annual gynecologic examination  She has the following concerns:    - new patient  Establish gyn care  Hasn't seen gyn in about 6 years since her son was born  Denies any abnormal pap smears in the past  She reports regular cycles but has noticed increased mood changes and cramping in the 2 weeks prior to her menses  She denies dyspareunia and feels safe at home  Mireya Nguyễn works for [employer] with [employer]  She was working from home for 2 years and is now going back into the office 3x a week         Health Maintenance:    Exercise: frequently  Breast exams/breast awareness: yes  Diet: well balanced diet      Past Medical History:   Diagnosis Date    Allergic     Varicella        Past Surgical History:   Procedure Laterality Date    APPENDECTOMY  2006     SECTION      WISDOM TOOTH EXTRACTION         Past OB/Gyn History:  Period Cycle (Days): 28  Period Duration (Days): 3  Period Pattern: Regular  Menstrual Flow: Moderate  Dysmenorrhea: (!) Mild (worse 2wks before her period)  Dysmenorrhea Symptoms: Cramping,Headache,NauseaPatient's last menstrual period was 04/07/2022 (exact date)  Last Pap: unavailable for review  History of abnormal Pap smear: no  HPV vaccine completed: no    Patient is currently sexually active  STD testing: no  Current contraception: none      Family History  Family History   Problem Relation Age of Onset    No Known Problems Mother     No Known Problems Father     Autoimmune disease Family     Thyroid disease Family        Family history of uterine or ovarian cancer: no  Family history of breast cancer: no  Family history of colon cancer: no    Social History:  Social History     Socioeconomic History    Marital status:      Spouse name: Not on file    Number of children: Not on file    Years of education: Not on file    Highest education level: Not on file   Occupational History    Not on file   Tobacco Use    Smoking status: Never Smoker    Smokeless tobacco: Never Used   Vaping Use    Vaping Use: Never used   Substance and Sexual Activity    Alcohol use: Yes     Comment: 1x/week    Drug use: No    Sexual activity: Yes     Partners: Male     Birth control/protection: None   Other Topics Concern    Not on file   Social History Narrative    Lives with son, ex-/boyfriend, dog    Works for [employer] ([employer])      Social Determinants of Health     Financial Resource Strain: Not on file   Food Insecurity: Not on file   Transportation Needs: Not on file   Physical Activity: Not on file   Stress: Not on file   Social Connections: Not on file   Intimate Partner Violence: Not on file   Housing Stability: Not on file     Domestic violence screen: negative    Allergies:  No Known Allergies    Medications:    Current Outpatient Medications:     loratadine (CLARITIN) 10 mg tablet, Take 10 mg by mouth daily, Disp: , Rfl:     Review of Systems:  Review of Systems   Constitutional: Negative for activity change, appetite change and unexpected weight change     Respiratory: Negative for cough and shortness of breath  Cardiovascular: Negative for chest pain  Gastrointestinal: Negative for abdominal pain, constipation, diarrhea, nausea and vomiting  Genitourinary: Negative for difficulty urinating, dyspareunia, frequency, menstrual problem, pelvic pain, urgency, vaginal bleeding, vaginal discharge and vaginal pain  Musculoskeletal: Negative for back pain  Skin: Negative  Neurological: Negative for dizziness, weakness, light-headedness and headaches  Psychiatric/Behavioral: Negative  Physical Exam:  /64   Ht 5' 6" (1 676 m)   Wt 87 1 kg (192 lb)   LMP 04/07/2022 (Exact Date)   BMI 30 99 kg/m²    Physical Exam  Constitutional:       General: She is not in acute distress  Appearance: Normal appearance  She is well-developed and normal weight  She is not diaphoretic  Genitourinary:      Vulva and bladder normal       No lesions in the vagina  Genitourinary Comments: Perineum normal in appearance, no lacerations, no ulcerations, no lesions visualized  Right Labia: No rash, tenderness or lesions  Left Labia: No tenderness, lesions or rash  No inguinal adenopathy present in the right or left side  No vaginal discharge, erythema, tenderness or bleeding  No vaginal prolapse present  No vaginal atrophy present  Right Adnexa: not tender, not full and no mass present  Left Adnexa: not tender, not full and no mass present  Cervix is nulliparous  No cervical motion tenderness, discharge, friability, lesion or polyp  No parametrium nodularity or thickening present  Uterus is not enlarged or tender  No uterine mass detected  Uterus is midaxial       No urethral prolapse or mass present  Bladder is not tender  Pelvic exam was performed with patient in the lithotomy position  Rectum:      No tenderness or external hemorrhoid     Breasts: Breasts are symmetrical       Right: No swelling, bleeding, mass, skin change or tenderness  Left: No swelling, bleeding, mass, skin change or tenderness  HENT:      Head: Normocephalic and atraumatic  Neck:      Thyroid: No thyromegaly or thyroid tenderness  Cardiovascular:      Rate and Rhythm: Normal rate and regular rhythm  Heart sounds: Normal heart sounds  No murmur heard  No friction rub  Pulmonary:      Effort: Pulmonary effort is normal  No respiratory distress  Breath sounds: Normal breath sounds  No wheezing or rales  Abdominal:      Palpations: Abdomen is soft  There is no mass  Tenderness: There is no abdominal tenderness  There is no guarding  Musculoskeletal:         General: No tenderness  Normal range of motion  Right lower leg: No edema  Left lower leg: No edema  Lymphadenopathy:      Lower Body: No right inguinal adenopathy  No left inguinal adenopathy  Neurological:      Mental Status: She is alert and oriented to person, place, and time  Skin:     General: Skin is warm and dry  Coloration: Skin is not pale  Findings: No erythema  Psychiatric:         Mood and Affect: Mood normal          Behavior: Behavior normal          Thought Content: Thought content normal          Judgment: Judgment normal    Vitals and nursing note reviewed

## 2022-04-14 ENCOUNTER — OFFICE VISIT (OUTPATIENT)
Dept: FAMILY MEDICINE CLINIC | Facility: CLINIC | Age: 37
End: 2022-04-14
Payer: COMMERCIAL

## 2022-04-14 VITALS
TEMPERATURE: 99.4 F | HEART RATE: 83 BPM | HEIGHT: 66 IN | BODY MASS INDEX: 30.86 KG/M2 | OXYGEN SATURATION: 96 % | SYSTOLIC BLOOD PRESSURE: 118 MMHG | DIASTOLIC BLOOD PRESSURE: 70 MMHG | WEIGHT: 192 LBS

## 2022-04-14 DIAGNOSIS — R41.840 DIFFICULTY CONCENTRATING: ICD-10-CM

## 2022-04-14 DIAGNOSIS — R41.89 BRAIN FOG: ICD-10-CM

## 2022-04-14 DIAGNOSIS — R53.83 OTHER FATIGUE: Primary | ICD-10-CM

## 2022-04-14 DIAGNOSIS — M25.50 ARTHRALGIA, UNSPECIFIED JOINT: ICD-10-CM

## 2022-04-14 PROCEDURE — 99214 OFFICE O/P EST MOD 30 MIN: CPT | Performed by: FAMILY MEDICINE

## 2022-04-14 PROCEDURE — 3008F BODY MASS INDEX DOCD: CPT | Performed by: FAMILY MEDICINE

## 2022-04-14 NOTE — PROGRESS NOTES
John Mitchell 1985 female MRN: [id number]      ASSESSMENT/PLAN  Problem List Items Addressed This Visit     None      Visit Diagnoses     Other fatigue    -  Primary    Relevant Orders    RF Screen w/ Reflex to Titer    C-reactive protein    Vitamin D 25 hydroxy    MISCELLANEOUS LAB TEST    Difficulty concentrating        Relevant Orders    RF Screen w/ Reflex to Titer    C-reactive protein    Vitamin D 25 hydroxy    MISCELLANEOUS LAB TEST    Brain fog        Relevant Orders    RF Screen w/ Reflex to Titer    C-reactive protein    Vitamin D 25 hydroxy    MISCELLANEOUS LAB TEST    Arthralgia, unspecified joint        Relevant Orders    RF Screen w/ Reflex to Titer    C-reactive protein    Vitamin D 25 hydroxy    MISCELLANEOUS LAB TEST        Wide differential for constellation of symptoms including anemia, thyroid dysfunction, autoimmune or rheumatologic issue, Lyme disease, vitamin deficiency  Encouraged to complete labs as previously ordered in addition to labs ordered today  Will determine next steps pending results  If labs benign, pt may benefit from sleep eval given reports of disrupted sleep  Future Appointments   Date Time Provider Jessi Lane   4/17/2023  1:00 PM Svetlana Stern MD [facility]          SUBJECTIVE  CC: Fatigue ([id number]), brain fog ([id number]), and hard time focusing      HPI:  John Mitchell is a 31 y o  female who presents due to multiple concerns as detailed below       For at least 6 months, but worsening over the past few months:   Having difficulty focusing, feeling fatigue, brain fog a lot, getting very overwhelmed very easily and shutting down   She is trying to get 8 hours of sleep -- does sleep lightly, so has interrupted sleep   Started taking her temperature daily -- was reading 99 every day; feels like she is warm, but cold at the same time -- hands get very cold and difficult to move, used a heated blanket arlyn Hackett for work about 3 times per year; is splitting time at home and in office for work   Had flu in December     Review of Systems   Constitutional: Positive for fatigue  Denies hair, skin, nails changes    Gastrointestinal: Positive for abdominal distention and abdominal pain (constantly hurts -- has been present her "entire" life)  Negative for constipation and diarrhea  Otherwise regular BMs   Endocrine: Negative for cold intolerance and heat intolerance  Musculoskeletal: Positive for back pain, joint swelling (fingers/hands/ankles/feet swell with acitivty) and neck pain (especially L, has decreased ROM)  Intermittently since December    Skin: Positive for color change (blotchy hands with activity)  Neurological: Positive for tremors (shakey in her body like she needs to eat something) and light-headedness (sometimes randomly )  Psychiatric/Behavioral: Positive for decreased concentration and sleep disturbance         Historical Information   The patient history was reviewed and updated as follows:    Past Medical History:   Diagnosis Date    Allergic     Varicella      Past Surgical History:   Procedure Laterality Date    APPENDECTOMY  2006     SECTION      WISDOM TOOTH EXTRACTION       Family History   Problem Relation Age of Onset    No Known Problems Mother     No Known Problems Father     Autoimmune disease Family     Thyroid disease Family       Social History   Social History     Substance and Sexual Activity   Alcohol Use Yes    Comment: 1x/week     Social History     Substance and Sexual Activity   Drug Use No     Social History     Tobacco Use   Smoking Status Never Smoker   Smokeless Tobacco Never Used       Medications:     Current Outpatient Medications:     loratadine (CLARITIN) 10 mg tablet, Take 10 mg by mouth daily, Disp: , Rfl:   No Known Allergies    OBJECTIVE    Vitals:   Vitals:    22 1538   BP: 118/70   BP Location: Left arm   Patient Position: Sitting   Cuff Size: Large   Pulse: 83   Temp: 99 4 °F (37 4 °C)   SpO2: 96%   Weight: 87 1 kg (192 lb)   Height: 5' 6" (1 676 m)           Physical Exam  Vitals and nursing note reviewed  Constitutional:       General: She is not in acute distress  Appearance: Normal appearance  HENT:      Head: Normocephalic and atraumatic  Pulmonary:      Effort: Pulmonary effort is normal  No respiratory distress  Neurological:      General: No focal deficit present  Mental Status: She is alert     Psychiatric:         Mood and Affect: Mood normal                     DO Rory Rasheed [address] [facility]   4/14/2022  4:00 PM

## 2022-04-15 LAB
HPV HR 12 DNA CVX QL NAA+PROBE: NEGATIVE
HPV16 DNA CVX QL NAA+PROBE: NEGATIVE
HPV18 DNA CVX QL NAA+PROBE: NEGATIVE

## 2022-04-18 NOTE — RESULT ENCOUNTER NOTE
Arthur Garland,     Your HPV testing is negative  Your pap is still pending, and will be updated soon  Please contact the office at [phone] with any questions       Markie
Never

## 2022-04-21 LAB
LAB AP GYN PRIMARY INTERPRETATION: NORMAL
Lab: NORMAL
PATH INTERP SPEC-IMP: NORMAL

## 2022-04-23 ENCOUNTER — APPOINTMENT (OUTPATIENT)
Dept: LAB | Facility: CLINIC | Age: 37
End: 2022-04-23
Payer: COMMERCIAL

## 2022-04-23 DIAGNOSIS — R41.840 DIFFICULTY CONCENTRATING: ICD-10-CM

## 2022-04-23 DIAGNOSIS — Z11.4 SCREENING FOR HIV (HUMAN IMMUNODEFICIENCY VIRUS): ICD-10-CM

## 2022-04-23 DIAGNOSIS — Z83.2 FAMILY HISTORY OF AUTOIMMUNE DISORDER: ICD-10-CM

## 2022-04-23 DIAGNOSIS — R20.2 TINGLING IN EXTREMITIES: ICD-10-CM

## 2022-04-23 DIAGNOSIS — Z13.220 SCREENING, LIPID: ICD-10-CM

## 2022-04-23 DIAGNOSIS — Z13.1 SCREENING FOR DIABETES MELLITUS: ICD-10-CM

## 2022-04-23 DIAGNOSIS — Z11.59 ENCOUNTER FOR HEPATITIS C SCREENING TEST FOR LOW RISK PATIENT: ICD-10-CM

## 2022-04-23 DIAGNOSIS — R41.89 BRAIN FOG: ICD-10-CM

## 2022-04-23 DIAGNOSIS — Z83.49 FAMILY HISTORY OF THYROID DISEASE: ICD-10-CM

## 2022-04-23 DIAGNOSIS — R53.83 OTHER FATIGUE: ICD-10-CM

## 2022-04-23 DIAGNOSIS — M25.50 ARTHRALGIA, UNSPECIFIED JOINT: ICD-10-CM

## 2022-04-23 DIAGNOSIS — R53.83 FATIGUE, UNSPECIFIED TYPE: ICD-10-CM

## 2022-04-23 LAB
25(OH)D3 SERPL-MCNC: 25.5 NG/ML (ref 30–100)
ALBUMIN SERPL BCP-MCNC: 3.9 G/DL (ref 3.5–5)
ALP SERPL-CCNC: 48 U/L (ref 46–116)
ALT SERPL W P-5'-P-CCNC: 34 U/L (ref 12–78)
ANION GAP SERPL CALCULATED.3IONS-SCNC: 4 MMOL/L (ref 4–13)
AST SERPL W P-5'-P-CCNC: 21 U/L (ref 5–45)
BASOPHILS # BLD AUTO: 0.08 THOUSANDS/ΜL (ref 0–0.1)
BASOPHILS NFR BLD AUTO: 1 % (ref 0–1)
BILIRUB SERPL-MCNC: 0.53 MG/DL (ref 0.2–1)
BUN SERPL-MCNC: 11 MG/DL (ref 5–25)
CALCIUM SERPL-MCNC: 8.8 MG/DL (ref 8.3–10.1)
CHLORIDE SERPL-SCNC: 106 MMOL/L (ref 100–108)
CHOLEST SERPL-MCNC: 149 MG/DL
CO2 SERPL-SCNC: 27 MMOL/L (ref 21–32)
CREAT SERPL-MCNC: 0.78 MG/DL (ref 0.6–1.3)
CRP SERPL QL: 6.6 MG/L
EOSINOPHIL # BLD AUTO: 0.28 THOUSAND/ΜL (ref 0–0.61)
EOSINOPHIL NFR BLD AUTO: 4 % (ref 0–6)
ERYTHROCYTE [DISTWIDTH] IN BLOOD BY AUTOMATED COUNT: 12.4 % (ref 11.6–15.1)
GFR SERPL CREATININE-BSD FRML MDRD: 99 ML/MIN/1.73SQ M
GLUCOSE P FAST SERPL-MCNC: 93 MG/DL (ref 65–99)
HCT VFR BLD AUTO: 41.7 % (ref 34.8–46.1)
HCV AB SER QL: NORMAL
HDLC SERPL-MCNC: 63 MG/DL
HGB BLD-MCNC: 13.8 G/DL (ref 11.5–15.4)
IMM GRANULOCYTES # BLD AUTO: 0.01 THOUSAND/UL (ref 0–0.2)
IMM GRANULOCYTES NFR BLD AUTO: 0 % (ref 0–2)
LDLC SERPL CALC-MCNC: 67 MG/DL (ref 0–100)
LYMPHOCYTES # BLD AUTO: 2.34 THOUSANDS/ΜL (ref 0.6–4.47)
LYMPHOCYTES NFR BLD AUTO: 32 % (ref 14–44)
MCH RBC QN AUTO: 30.5 PG (ref 26.8–34.3)
MCHC RBC AUTO-ENTMCNC: 33.1 G/DL (ref 31.4–37.4)
MCV RBC AUTO: 92 FL (ref 82–98)
MONOCYTES # BLD AUTO: 0.53 THOUSAND/ΜL (ref 0.17–1.22)
MONOCYTES NFR BLD AUTO: 7 % (ref 4–12)
NEUTROPHILS # BLD AUTO: 4.15 THOUSANDS/ΜL (ref 1.85–7.62)
NEUTS SEG NFR BLD AUTO: 56 % (ref 43–75)
NONHDLC SERPL-MCNC: 86 MG/DL
NRBC BLD AUTO-RTO: 0 /100 WBCS
PLATELET # BLD AUTO: 114 THOUSANDS/UL (ref 149–390)
PMV BLD AUTO: 10.8 FL (ref 8.9–12.7)
POTASSIUM SERPL-SCNC: 4.3 MMOL/L (ref 3.5–5.3)
PROT SERPL-MCNC: 7.5 G/DL (ref 6.4–8.2)
RBC # BLD AUTO: 4.52 MILLION/UL (ref 3.81–5.12)
SODIUM SERPL-SCNC: 137 MMOL/L (ref 136–145)
TRIGL SERPL-MCNC: 94 MG/DL
TSH SERPL DL<=0.05 MIU/L-ACNC: 2.69 UIU/ML (ref 0.45–4.5)
WBC # BLD AUTO: 7.39 THOUSAND/UL (ref 4.31–10.16)

## 2022-04-23 PROCEDURE — 80061 LIPID PANEL: CPT

## 2022-04-23 PROCEDURE — 86038 ANTINUCLEAR ANTIBODIES: CPT

## 2022-04-23 PROCEDURE — 80053 COMPREHEN METABOLIC PANEL: CPT

## 2022-04-23 PROCEDURE — 84443 ASSAY THYROID STIM HORMONE: CPT

## 2022-04-23 PROCEDURE — 86803 HEPATITIS C AB TEST: CPT

## 2022-04-23 PROCEDURE — 86618 LYME DISEASE ANTIBODY: CPT

## 2022-04-23 PROCEDURE — 36415 COLL VENOUS BLD VENIPUNCTURE: CPT

## 2022-04-23 PROCEDURE — 82306 VITAMIN D 25 HYDROXY: CPT

## 2022-04-23 PROCEDURE — 86140 C-REACTIVE PROTEIN: CPT

## 2022-04-23 PROCEDURE — 87389 HIV-1 AG W/HIV-1&-2 AB AG IA: CPT

## 2022-04-23 PROCEDURE — 86430 RHEUMATOID FACTOR TEST QUAL: CPT

## 2022-04-23 PROCEDURE — 85025 COMPLETE CBC W/AUTO DIFF WBC: CPT

## 2022-04-24 PROBLEM — E55.9 VITAMIN D DEFICIENCY: Status: ACTIVE | Noted: 2022-04-24

## 2022-04-24 LAB — HIV 1+2 AB+HIV1 P24 AG SERPL QL IA: NORMAL

## 2022-04-25 LAB
ANA TITR SER IF: NEGATIVE {TITER}
RHEUMATOID FACT SER QL LA: NEGATIVE

## 2022-04-26 LAB — B BURGDOR IGG+IGM SER-ACNC: 20

## 2022-08-18 ENCOUNTER — OFFICE VISIT (OUTPATIENT)
Dept: URGENT CARE | Facility: CLINIC | Age: 37
End: 2022-08-18
Payer: COMMERCIAL

## 2022-08-18 VITALS
TEMPERATURE: 98.5 F | HEART RATE: 70 BPM | RESPIRATION RATE: 20 BRPM | DIASTOLIC BLOOD PRESSURE: 62 MMHG | SYSTOLIC BLOOD PRESSURE: 110 MMHG

## 2022-08-18 DIAGNOSIS — G44.209 TENSION HEADACHE: Primary | ICD-10-CM

## 2022-08-18 PROCEDURE — 99213 OFFICE O/P EST LOW 20 MIN: CPT | Performed by: PHYSICIAN ASSISTANT

## 2022-08-18 RX ORDER — NAPROXEN 500 MG/1
500 TABLET ORAL 2 TIMES DAILY WITH MEALS
Qty: 30 TABLET | Refills: 0 | Status: SHIPPED | OUTPATIENT
Start: 2022-08-18

## 2022-08-18 RX ORDER — BUTALBITAL, ACETAMINOPHEN AND CAFFEINE 300; 40; 50 MG/1; MG/1; MG/1
1-2 CAPSULE ORAL 3 TIMES DAILY PRN
Qty: 30 CAPSULE | Refills: 0 | Status: SHIPPED | OUTPATIENT
Start: 2022-08-18

## 2022-08-18 NOTE — PATIENT INSTRUCTIONS
1  Go to the ER immediately for any worsening symptoms  2  Follow-up with primary care in 5-7 days for any persistent symptoms  3  Increase oral fluids  4  Use the Naprosyn for baseline symptom control  For any symptoms that are not controlled by the Naprosyn, use the Fioricet as prescribed

## 2022-08-18 NOTE — PROGRESS NOTES
[facility]        NAME: Elie Dye is a 31 y o  female  : 1985    MRN: [id number]  DATE: 2022  TIME: 3:06 PM    Assessment and Plan   Tension headache [G44 209]  1  Tension headache  naproxen (Naprosyn) 500 mg tablet    Butalbital-APAP-Caffeine (Fioricet) -40 MG CAPS         Patient Instructions     1  Go to the ER immediately for any worsening symptoms  2  Follow-up with primary care in 5-7 days for any persistent symptoms  3  Increase oral fluids  4  Use the Naprosyn for baseline symptom control  For any symptoms that are not controlled by the Naprosyn, use the Fioricet as prescribed  Chief Complaint     Chief Complaint   Patient presents with    Headache     Started one week ago with headache  Went away overnight but returned and has continued since then  Hands feel achey  Feet swelling for a few days  States it is on top of her head and in the back above the neck  Taking no meds  History of Present Illness       30-year-old female patient with a 1 week history of 7/10, pressure type headache which she said originates at the base of her skull and wraps around her head in a bandlike fashion  She states that the symptoms have been constant, not worse not better  Patient states she is somewhat photophobic with the symptoms  She has mild nausea without vomiting  She denies any skin to visual changes  She denies any neck or head injuries  Patient has not had similar headaches in the past   She states the symptoms are slightly worse with head and neck movement  Review of Systems   Review of Systems   Constitutional: Negative for chills and fever  HENT: Negative for ear pain and sore throat  Eyes: Negative for pain and visual disturbance  Respiratory: Negative for cough and shortness of breath  Cardiovascular: Negative for chest pain and palpitations  Gastrointestinal: Negative for abdominal pain and vomiting     Genitourinary: Negative for dysuria and hematuria  Musculoskeletal: Negative for arthralgias and back pain  Skin: Negative for color change and rash  Neurological: Positive for headaches  Negative for seizures and syncope  All other systems reviewed and are negative  Current Medications       Current Outpatient Medications:     Butalbital-APAP-Caffeine (Fioricet) -40 MG CAPS, Take 1-2 capsules by mouth 3 (three) times a day as needed (Headache), Disp: 30 capsule, Rfl: 0    loratadine (CLARITIN) 10 mg tablet, Take 10 mg by mouth daily, Disp: , Rfl:     naproxen (Naprosyn) 500 mg tablet, Take 1 tablet (500 mg total) by mouth 2 (two) times a day with meals, Disp: 30 tablet, Rfl: 0    Current Allergies     Allergies as of 2022    (No Known Allergies)            The following portions of the patient's history were reviewed and updated as appropriate: allergies, current medications, past family history, past medical history, past social history, past surgical history and problem list      Past Medical History:   Diagnosis Date    Allergic     Varicella        Past Surgical History:   Procedure Laterality Date    APPENDECTOMY  2006     SECTION      WISDOM TOOTH EXTRACTION         Family History   Problem Relation Age of Onset    No Known Problems Mother     No Known Problems Father     Autoimmune disease Family     Thyroid disease Family          Medications have been verified  Objective   /62 (Patient Position: Sitting)   Pulse 70   Temp 98 5 °F (36 9 °C) (Temporal)   Resp 20        Physical Exam     Physical Exam  Constitutional:       Appearance: Normal appearance  HENT:      Head: Normocephalic  Nose: Nose normal       Mouth/Throat:      Mouth: Mucous membranes are moist       Pharynx: Oropharynx is clear  Eyes:      Conjunctiva/sclera: Conjunctivae normal       Pupils: Pupils are equal, round, and reactive to light     Cardiovascular:      Rate and Rhythm: Normal rate and regular rhythm  Pulmonary:      Effort: Pulmonary effort is normal       Breath sounds: Normal breath sounds  Abdominal:      Tenderness: There is no abdominal tenderness  Musculoskeletal:         General: Normal range of motion  Cervical back: Normal range of motion  Comments: Palpation of the left side of the occiput reproduces headache symptoms  Skin:     General: Skin is warm and dry  Capillary Refill: Capillary refill takes less than 2 seconds  Neurological:      General: No focal deficit present  Mental Status: She is alert and oriented to person, place, and time  Cranial Nerves: No cranial nerve deficit  Sensory: No sensory deficit  Motor: No weakness        Coordination: Coordination normal       Gait: Gait normal       Deep Tendon Reflexes: Reflexes normal    Psychiatric:         Mood and Affect: Mood normal          Behavior: Behavior normal

## 2023-04-21 ENCOUNTER — APPOINTMENT (OUTPATIENT)
Dept: RADIOLOGY | Facility: CLINIC | Age: 38
End: 2023-04-21

## 2023-04-21 DIAGNOSIS — M25.441 FINGER JOINT SWELLING, RIGHT: ICD-10-CM

## 2023-04-25 ENCOUNTER — TELEPHONE (OUTPATIENT)
Dept: OBGYN CLINIC | Facility: HOSPITAL | Age: 38
End: 2023-04-25

## 2023-04-25 DIAGNOSIS — M25.441 FINGER JOINT SWELLING, RIGHT: Primary | ICD-10-CM

## 2023-04-25 NOTE — TELEPHONE ENCOUNTER
Patient is being referred to a orthopedics  Please schedule accordingly      [address]   [phone]

## 2023-06-22 ENCOUNTER — OFFICE VISIT (OUTPATIENT)
Dept: OBGYN CLINIC | Facility: CLINIC | Age: 38
End: 2023-06-22
Payer: COMMERCIAL

## 2023-06-22 VITALS — SYSTOLIC BLOOD PRESSURE: 114 MMHG | DIASTOLIC BLOOD PRESSURE: 76 MMHG | HEART RATE: 54 BPM

## 2023-06-22 DIAGNOSIS — M67.40 RETINACULAR GANGLION, VOLAR (VRG): Primary | ICD-10-CM

## 2023-06-22 DIAGNOSIS — M25.441 FINGER JOINT SWELLING, RIGHT: ICD-10-CM

## 2023-06-22 PROCEDURE — 99245 OFF/OP CONSLTJ NEW/EST HI 55: CPT | Performed by: SURGERY

## 2023-06-22 RX ORDER — CHLORHEXIDINE GLUCONATE 0.12 MG/ML
15 RINSE ORAL ONCE
OUTPATIENT
Start: 2023-06-22 | End: 2023-06-22

## 2023-06-22 NOTE — PROGRESS NOTES
Beto MCKENZIE  Attending, Orthopaedic Surgery  Hand, Wrist, and Elbow Surgery  [facility]      ORTHOPAEDIC HAND, WRIST, AND ELBOW OFFICE  VISIT       ASSESSMENT/PLAN:      30year old female with mass dorsal aspect of right long finger PIP joint suspicious for retinacular cyst vs ganglion cyst  Diagnostics reviewed and physical exam performed  Diagnosis, treatment options and associated risks were discussed with the patient including no treatment, nonsurgical treatment and potential for surgical intervention  The patient was given the opportunity to ask questions regarding each  At this time, patient wishes to proceed with surgical excision of the mass  Risks and benefits were discussed  Risks of the surgery are inclusive of but not limited to bleeding, infection, nerve injury, blood clot, worsening of symptoms, not achieving the anticipated results, persistent stiffness, weakness and the need for additional surgery  The patient verbally stated they understood those risks and would like to proceed with the surgery  Surgical consent was signed in the office today  I will see the patient the day of surgery  The patient verbalized understanding of exam findings and treatment plan  We engaged in the shared decision-making process and treatment options were discussed at length with the patient  Surgical and conservative management discussed today along with risks and benefits  Diagnoses and all orders for this visit:    Retinacular ganglion, 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[facility])    Finger joint swelling, right  -     Ambulatory referral to Hand Surgery        Follow Up:  Return for post-op      To Do Next Visit:  Re-evaluation of current issue        ____________________________________________________________________________________________________________________________________________      CHIEF COMPLAINT:  Chief Complaint   Patient presents with   • Right Hand - Pain     C/o lump on rt long finger  Painful at times  Has had it for a few months  SUBJECTIVE:  Darius Arroyo is a 30y o  year old RHD female who presents for initial evaluation of a mass on the dorsal aspect of her right long finger PIP joint  She states it developed 6 months ago with no known mechanism of injury, trauma, or inciting event that she can recall  She notes it can fluctuate in size based on her activity level that day  She notes it is painful for her when she picks up items, hits the area on an object  Pain/symptom timing:  Worse during the day when active  Pain/symptom context:  Worse with activites and work  Pain/symptom modifying factors:  Rest makes better, activities make worse  Pain/symptom associated signs/symptoms: none    Prior treatment   · NSAIDsYes   · Injections No   · Bracing/Orthotics No    Physical Therapy No     I have personally reviewed all the relevant PMH, PSH, SH, FH, Medications and allergies      PAST MEDICAL HISTORY:  Past Medical History:   Diagnosis Date   • Allergic    • Varicella        PAST SURGICAL HISTORY:  Past Surgical History:   Procedure Laterality Date   • APPENDECTOMY     •  SECTION     • WISDOM TOOTH EXTRACTION         FAMILY HISTORY:  Family History   Problem Relation Age of Onset   • Thyroid disease Mother    • No Known Problems Father    • Autoimmune disease Family    • Thyroid disease Family    • Thyroid disease Sister        SOCIAL HISTORY:  Social History     Tobacco Use   • Smoking status: Never   • Smokeless tobacco: Never   Vaping Use   • Vaping Use: Never used   Substance Use Topics   • Alcohol use:  Yes     Alcohol/week: 2 0 standard drinks of alcohol     Types: 2 Cans of beer per week     Comment: 1x/week   • Drug use: No       MEDICATIONS:    Current Outpatient Medications:   •  loratadine (CLARITIN) 10 mg tablet, Take 10 mg by mouth daily, Disp: , Rfl:   •  rizatriptan (MAXALT-MLT) 10 mg disintegrating tablet, Take 1 tablet (10 mg total) by "mouth once as needed for migraine for up to 1 dose may repeat in 2 hours if necessary, Disp: 10 tablet, Rfl: 1    ALLERGIES:  No Known Allergies        REVIEW OF SYSTEMS:  Review of Systems   Constitutional: Negative for chills and fever  HENT: Negative for ear pain and sore throat  Eyes: Negative for pain and visual disturbance  Respiratory: Negative for cough and shortness of breath  Cardiovascular: Negative for chest pain and palpitations  Gastrointestinal: Negative for abdominal pain and vomiting  Genitourinary: Negative for dysuria and hematuria  Musculoskeletal: Negative for arthralgias and back pain  Skin: Negative for color change and rash  Neurological: Negative for seizures and syncope  All other systems reviewed and are negative  VITALS:  Vitals:    06/22/23 0854   BP: 114/76   Pulse: (!) 54       LABS:  HgA1c: No results found for: \"HGBA1C\"  BMP:   Lab Results   Component Value Date    CALCIUM 8 8 04/23/2022    K 4 3 04/23/2022    CO2 27 04/23/2022     04/23/2022    BUN 11 04/23/2022    CREATININE 0 78 04/23/2022       _____________________________________________________  PHYSICAL EXAMINATION:  General: well developed and well nourished, alert, oriented times 3 and appears comfortable  Psychiatric: Normal  HEENT: Normocephalic, Atraumatic Trachea Midline, No torticollis  Pulmonary: No audible wheezing or respiratory distress   Abdomen/GI: Non tender, non distended   Cardiovascular: No pitting edema, 2+ radial pulse   Skin: Mass dorsal aspect of right long PIP Joint, No Erythema, No Lacerations, No Fluctuation, No Ulcerations  Neurovascular: Sensation Intact to the Median, Ulnar, Radial Nerve, Motor Intact to the Median, Ulnar, Radial Nerve and Pulses Intact  Musculoskeletal: Normal, except as noted in detailed exam and in HPI        MUSCULOSKELETAL EXAMINATION:      ___________________________________________________  STUDIES REVIEWED:  I have personally reviewed AP " lateral and oblique radiographs of right hand   which demonstrate no acute osseous abnormalities, no evidence of foreign body, no to minimal degenerative changes           PROCEDURES PERFORMED:  Procedures  No Procedures performed today    _____________________________________________________      Tyra Caldera    I,:  Argelia Walter am acting as a scribe while in the presence of the attending physician :       I,:  Ángel Bray MD personally performed the services described in this documentation    as scribed in my presence :

## 2023-06-22 NOTE — H&P
Ángel MCKENZIE  Attending, Orthopaedic Surgery  Hand, Wrist, and Elbow Surgery  [facility]      ORTHOPAEDIC HAND, WRIST, AND ELBOW OFFICE  VISIT       ASSESSMENT/PLAN:      30year old female with mass dorsal aspect of right long finger PIP joint suspicious for retinacular cyst vs ganglion cyst  Diagnostics reviewed and physical exam performed  Diagnosis, treatment options and associated risks were discussed with the patient including no treatment, nonsurgical treatment and potential for surgical intervention  The patient was given the opportunity to ask questions regarding each  At this time, patient wishes to proceed with surgical excision of the mass  Risks and benefits were discussed  Risks of the surgery are inclusive of but not limited to bleeding, infection, nerve injury, blood clot, worsening of symptoms, not achieving the anticipated results, persistent stiffness, weakness and the need for additional surgery  The patient verbally stated they understood those risks and would like to proceed with the surgery  Surgical consent was signed in the office today  I will see the patient the day of surgery  The patient verbalized understanding of exam findings and treatment plan  We engaged in the shared decision-making process and treatment options were discussed at length with the patient  Surgical and conservative management discussed today along with risks and benefits  Diagnoses and all orders for this visit:    Retinacular ganglion, volar (VRG)    Finger joint swelling, right  -     Ambulatory referral to Hand Surgery        Follow Up:  Return for post-op      To Do Next Visit:  Re-evaluation of current issue        ____________________________________________________________________________________________________________________________________________      CHIEF COMPLAINT:  Chief Complaint   Patient presents with   • Right Hand - Pain     C/o lump on rt long finger  Painful at times  Has had it for a few months  SUBJECTIVE:  Neeta Uriarte is a 30y o  year old RHD female who presents for initial evaluation of a mass on the dorsal aspect of her right long finger PIP joint  She states it developed 6 months ago with no known mechanism of injury, trauma, or inciting event that she can recall  She notes it can fluctuate in size based on her activity level that day  She notes it is painful for her when she picks up items, hits the area on an object  Pain/symptom timing:  Worse during the day when active  Pain/symptom context:  Worse with activites and work  Pain/symptom modifying factors:  Rest makes better, activities make worse  Pain/symptom associated signs/symptoms: none    Prior treatment   · NSAIDsYes   · Injections No   · Bracing/Orthotics No    Physical Therapy No     I have personally reviewed all the relevant PMH, PSH, SH, FH, Medications and allergies      PAST MEDICAL HISTORY:  Past Medical History:   Diagnosis Date   • Allergic    • Varicella        PAST SURGICAL HISTORY:  Past Surgical History:   Procedure Laterality Date   • APPENDECTOMY     •  SECTION     • WISDOM TOOTH EXTRACTION         FAMILY HISTORY:  Family History   Problem Relation Age of Onset   • Thyroid disease Mother    • No Known Problems Father    • Autoimmune disease Family    • Thyroid disease Family    • Thyroid disease Sister        SOCIAL HISTORY:  Social History     Tobacco Use   • Smoking status: Never   • Smokeless tobacco: Never   Vaping Use   • Vaping Use: Never used   Substance Use Topics   • Alcohol use:  Yes     Alcohol/week: 2 0 standard drinks of alcohol     Types: 2 Cans of beer per week     Comment: 1x/week   • Drug use: No       MEDICATIONS:    Current Outpatient Medications:   •  loratadine (CLARITIN) 10 mg tablet, Take 10 mg by mouth daily, Disp: , Rfl:   •  rizatriptan (MAXALT-MLT) 10 mg disintegrating tablet, Take 1 tablet (10 mg total) by "mouth once as needed for migraine for up to 1 dose may repeat in 2 hours if necessary, Disp: 10 tablet, Rfl: 1    ALLERGIES:  No Known Allergies        REVIEW OF SYSTEMS:  Review of Systems   Constitutional: Negative for chills and fever  HENT: Negative for ear pain and sore throat  Eyes: Negative for pain and visual disturbance  Respiratory: Negative for cough and shortness of breath  Cardiovascular: Negative for chest pain and palpitations  Gastrointestinal: Negative for abdominal pain and vomiting  Genitourinary: Negative for dysuria and hematuria  Musculoskeletal: Negative for arthralgias and back pain  Skin: Negative for color change and rash  Neurological: Negative for seizures and syncope  All other systems reviewed and are negative  VITALS:  Vitals:    06/22/23 0854   BP: 114/76   Pulse: (!) 54       LABS:  HgA1c: No results found for: \"HGBA1C\"  BMP:   Lab Results   Component Value Date    CALCIUM 8 8 04/23/2022    K 4 3 04/23/2022    CO2 27 04/23/2022     04/23/2022    BUN 11 04/23/2022    CREATININE 0 78 04/23/2022       _____________________________________________________  PHYSICAL EXAMINATION:  General: well developed and well nourished, alert, oriented times 3 and appears comfortable  Psychiatric: Normal  HEENT: Normocephalic, Atraumatic Trachea Midline, No torticollis  Pulmonary: No audible wheezing or respiratory distress   Abdomen/GI: Non tender, non distended   Cardiovascular: No pitting edema, 2+ radial pulse   Skin: Mass dorsal aspect of right long PIP Joint, No Erythema, No Lacerations, No Fluctuation, No Ulcerations  Neurovascular: Sensation Intact to the Median, Ulnar, Radial Nerve, Motor Intact to the Median, Ulnar, Radial Nerve and Pulses Intact  Musculoskeletal: Normal, except as noted in detailed exam and in HPI        MUSCULOSKELETAL EXAMINATION:      ___________________________________________________  STUDIES REVIEWED:  I have personally reviewed AP " lateral and oblique radiographs of right hand   which demonstrate no acute osseous abnormalities, no evidence of foreign body, no to minimal degenerative changes           PROCEDURES PERFORMED:  Procedures  No Procedures performed today    _____________________________________________________      Jayesh Avila    I,:  Capri Caruso am acting as a scribe while in the presence of the attending physician :       I,:  Maura John MD personally performed the services described in this documentation    as scribed in my presence :

## 2023-09-18 ENCOUNTER — OFFICE VISIT (OUTPATIENT)
Dept: URGENT CARE | Facility: CLINIC | Age: 38
End: 2023-09-18
Payer: COMMERCIAL

## 2023-09-18 VITALS
SYSTOLIC BLOOD PRESSURE: 133 MMHG | RESPIRATION RATE: 18 BRPM | TEMPERATURE: 99.4 F | DIASTOLIC BLOOD PRESSURE: 78 MMHG | HEART RATE: 81 BPM | OXYGEN SATURATION: 99 %

## 2023-09-18 DIAGNOSIS — J06.9 VIRAL URI: ICD-10-CM

## 2023-09-18 DIAGNOSIS — R05.1 ACUTE COUGH: Primary | ICD-10-CM

## 2023-09-18 LAB
SARS-COV-2 AG UPPER RESP QL IA: NEGATIVE
VALID CONTROL: NORMAL

## 2023-09-18 PROCEDURE — 87811 SARS-COV-2 COVID19 W/OPTIC: CPT

## 2023-09-18 PROCEDURE — 99213 OFFICE O/P EST LOW 20 MIN: CPT

## 2023-09-18 NOTE — LETTER
September 18, 2023     Patient: Tish Driver   YOB: 1985   Date of Visit: 9/18/2023       To Whom it May Concern:    Neisha Santos was seen in my clinic on 9/18/2023. She may return to work on 9/20/2023 as long as she has been fever free for 24 hours without the use of medication . If you have any questions or concerns, please don't hesitate to call.          Sincerely,          JOSE Laws        CC: No Recipients

## 2023-09-18 NOTE — PATIENT INSTRUCTIONS
Vitamin D3 2000 IU daily. Vitamin C 1000mg twice per day. Multivitamin daily. Some studies suggest that Zinc 12.5-15mg every 2 hours while awake x 5 days may shorten the duration cold symptoms by 1-2 days. Fluids and rest.  Nasal saline spray; Afrin if severe congestion (do not use for more than 3 days)  Over the counter cough/cold medications as needed. Flonase nasal spray. Tylenol/Ibuprofen for pain/fever. Salt water gargles and/or chloraseptic spray. Warm tea with honey. Warm compresses over sinuses. Nasal rinses with distilled water. Follow up with PCP if symptoms persist past 10-14 days. Proceed to the ER with worsening symptoms.

## 2023-09-18 NOTE — PROGRESS NOTES
[facility]        NAME: Khanh Beltran is a 41 y.o. female  : 1985    MRN: [id number]  DATE: 2023  TIME: 1:11 PM    Assessment and Plan   Acute cough [R05.1]  1. Acute cough  Poct Covid 19 Rapid Antigen Test      2. Viral URI          Rapid COVID negative. Work note given. Patient Instructions     Vitamin D3 2000 IU daily. Vitamin C 1000mg twice per day. Multivitamin daily. Some studies suggest that Zinc 12.5-15mg every 2 hours while awake x 5 days may shorten the duration cold symptoms by 1-2 days. Fluids and rest.  Nasal saline spray; Afrin if severe congestion (do not use for more than 3 days)  Over the counter cough/cold medications as needed. Flonase nasal spray. Tylenol/Ibuprofen for pain/fever. Salt water gargles and/or chloraseptic spray. Warm tea with honey. Warm compresses over sinuses. Nasal rinses with distilled water. Follow up with PCP if symptoms persist past 10-14 days. Proceed to the ER with worsening symptoms. Chief Complaint     Chief Complaint   Patient presents with   • Earache     Verbalizes nasal congestion, fever 101, PND, and ear pain and pressure started yesterday taking day quill with little relief. History of Present Illness       The patient presents today with complaints of URI symptoms that started yesterday. She also reports a fever of 101 this morning. She has been taking OTC tylenol/motrin as needed, dayquil and nyquil as needed with some relief. Denies any known sick contacts. Sore Throat   This is a new problem. The current episode started yesterday. The problem has been rapidly worsening. Neither side of throat is experiencing more pain than the other. The maximum temperature recorded prior to her arrival was 101 - 101.9 F. The fever has been present for 1 to 2 days. The pain is at a severity of 5/10.  Associated symptoms include congestion, coughing, ear pain, headaches, a hoarse voice, a plugged ear sensation, neck pain, swollen glands and trouble swallowing. Pertinent negatives include no abdominal pain, diarrhea, drooling, ear discharge, shortness of breath, stridor or vomiting. She has had no exposure to strep or mono. Review of Systems   Review of Systems   Constitutional: Positive for chills, fatigue and fever. HENT: Positive for congestion, ear pain, hoarse voice, postnasal drip, rhinorrhea, sneezing, sore throat and trouble swallowing. Negative for drooling and ear discharge. Respiratory: Positive for cough. Negative for shortness of breath and stridor. Gastrointestinal: Negative for abdominal pain, diarrhea and vomiting. Musculoskeletal: Positive for neck pain. Neurological: Positive for headaches. Current Medications       Current Outpatient Medications:   •  loratadine (CLARITIN) 10 mg tablet, Take 10 mg by mouth daily, Disp: , Rfl:   •  rizatriptan (MAXALT-MLT) 10 mg disintegrating tablet, Take 1 tablet (10 mg total) by mouth once as needed for migraine for up to 1 dose may repeat in 2 hours if necessary, Disp: 10 tablet, Rfl: 1    Current Allergies     Allergies as of 2023   • (No Known Allergies)            The following portions of the patient's history were reviewed and updated as appropriate: allergies, current medications, past family history, past medical history, past social history, past surgical history and problem list.     Past Medical History:   Diagnosis Date   • Allergic    • Varicella        Past Surgical History:   Procedure Laterality Date   • APPENDECTOMY     •  SECTION     • WISDOM TOOTH EXTRACTION         Family History   Problem Relation Age of Onset   • Thyroid disease Mother    • No Known Problems Father    • Autoimmune disease Family    • Thyroid disease Family    • Thyroid disease Sister          Medications have been verified.         Objective   /78   Pulse 81   Temp 99.4 °F (37.4 °C)   Resp 18   SpO2 99% Physical Exam     Physical Exam  Vitals and nursing note reviewed. Constitutional:       General: She is not in acute distress. Appearance: Normal appearance. She is not ill-appearing. HENT:      Head: Normocephalic and atraumatic. Right Ear: Tympanic membrane, ear canal and external ear normal.      Left Ear: Tympanic membrane, ear canal and external ear normal.      Nose: Congestion present. No rhinorrhea. Mouth/Throat:      Lips: Pink. Mouth: Mucous membranes are moist.      Pharynx: Posterior oropharyngeal erythema present. No oropharyngeal exudate. Tonsils: No tonsillar exudate. Eyes:      General: Vision grossly intact. Extraocular Movements: Extraocular movements intact. Pupils: Pupils are equal, round, and reactive to light. Cardiovascular:      Rate and Rhythm: Normal rate and regular rhythm. Heart sounds: Normal heart sounds. No murmur heard. Pulmonary:      Effort: Pulmonary effort is normal. No respiratory distress. Breath sounds: Normal breath sounds. No decreased air movement. No decreased breath sounds, wheezing, rhonchi or rales. Musculoskeletal:         General: Normal range of motion. Cervical back: Normal range of motion. Skin:     General: Skin is warm. Findings: No rash. Neurological:      Mental Status: She is alert and oriented to person, place, and time. Motor: Motor function is intact. Gait: Gait is intact.    Psychiatric:         Attention and Perception: Attention normal.         Mood and Affect: Mood normal.

## 2023-12-15 ENCOUNTER — APPOINTMENT (OUTPATIENT)
Dept: LAB | Facility: HOSPITAL | Age: 38
End: 2023-12-15
Payer: COMMERCIAL

## 2023-12-15 DIAGNOSIS — Z13.1 SCREENING FOR DIABETES MELLITUS: ICD-10-CM

## 2023-12-15 DIAGNOSIS — Z13.220 SCREENING, LIPID: ICD-10-CM

## 2023-12-15 LAB
ALBUMIN SERPL BCP-MCNC: 4.4 G/DL (ref 3.5–5)
ALP SERPL-CCNC: 47 U/L (ref 34–104)
ALT SERPL W P-5'-P-CCNC: 12 U/L (ref 7–52)
ANION GAP SERPL CALCULATED.3IONS-SCNC: 4 MMOL/L
AST SERPL W P-5'-P-CCNC: 12 U/L (ref 13–39)
BILIRUB SERPL-MCNC: 0.56 MG/DL (ref 0.2–1)
BUN SERPL-MCNC: 11 MG/DL (ref 5–25)
CALCIUM SERPL-MCNC: 9.4 MG/DL (ref 8.4–10.2)
CHLORIDE SERPL-SCNC: 107 MMOL/L (ref 96–108)
CHOLEST SERPL-MCNC: 158 MG/DL
CO2 SERPL-SCNC: 29 MMOL/L (ref 21–32)
CREAT SERPL-MCNC: 0.76 MG/DL (ref 0.6–1.3)
GFR SERPL CREATININE-BSD FRML MDRD: 101 ML/MIN/1.73SQ M
GLUCOSE P FAST SERPL-MCNC: 91 MG/DL (ref 65–99)
HDLC SERPL-MCNC: 53 MG/DL
LDLC SERPL CALC-MCNC: 78 MG/DL (ref 0–100)
NONHDLC SERPL-MCNC: 105 MG/DL
POTASSIUM SERPL-SCNC: 4.7 MMOL/L (ref 3.5–5.3)
PROT SERPL-MCNC: 7.4 G/DL (ref 6.4–8.4)
SODIUM SERPL-SCNC: 140 MMOL/L (ref 135–147)
TRIGL SERPL-MCNC: 136 MG/DL

## 2023-12-15 PROCEDURE — 80061 LIPID PANEL: CPT

## 2023-12-15 PROCEDURE — 80053 COMPREHEN METABOLIC PANEL: CPT

## 2023-12-15 PROCEDURE — 36415 COLL VENOUS BLD VENIPUNCTURE: CPT

## 2024-11-07 ENCOUNTER — OFFICE VISIT (OUTPATIENT)
Dept: URGENT CARE | Facility: MEDICAL CENTER | Age: 39
End: 2024-11-07
Payer: COMMERCIAL

## 2024-11-07 VITALS
RESPIRATION RATE: 18 BRPM | TEMPERATURE: 103.7 F | OXYGEN SATURATION: 96 % | BODY MASS INDEX: 35.19 KG/M2 | DIASTOLIC BLOOD PRESSURE: 86 MMHG | WEIGHT: 218 LBS | HEART RATE: 124 BPM | SYSTOLIC BLOOD PRESSURE: 142 MMHG

## 2024-11-07 DIAGNOSIS — J02.0 STREP PHARYNGITIS: Primary | ICD-10-CM

## 2024-11-07 DIAGNOSIS — R50.9 FEVER, UNSPECIFIED FEVER CAUSE: ICD-10-CM

## 2024-11-07 LAB — S PYO AG THROAT QL: POSITIVE

## 2024-11-07 PROCEDURE — G0382 LEV 3 HOSP TYPE B ED VISIT: HCPCS | Performed by: FAMILY MEDICINE

## 2024-11-07 PROCEDURE — 87880 STREP A ASSAY W/OPTIC: CPT | Performed by: FAMILY MEDICINE

## 2024-11-07 PROCEDURE — S9083 URGENT CARE CENTER GLOBAL: HCPCS | Performed by: FAMILY MEDICINE

## 2024-11-07 RX ORDER — ACETAMINOPHEN 160 MG/5ML
650 SUSPENSION ORAL ONCE
Status: COMPLETED | OUTPATIENT
Start: 2024-11-07 | End: 2024-11-07

## 2024-11-07 RX ORDER — AMOXICILLIN 400 MG/5ML
500 POWDER, FOR SUSPENSION ORAL 2 TIMES DAILY
Qty: 126 ML | Refills: 0 | Status: SHIPPED | OUTPATIENT
Start: 2024-11-07 | End: 2024-11-10

## 2024-11-07 RX ADMIN — ACETAMINOPHEN 650 MG: 160 SUSPENSION ORAL at 12:42

## 2024-11-07 NOTE — PROGRESS NOTES
[facility]        NAME: Holli Driver is a 39 y.o. female  : 1985    MRN: [id number]  DATE: 2024  TIME: 1:18 PM    Assessment and Plan   Strep pharyngitis [J02.0]  1. Strep pharyngitis        2. Fever, unspecified fever cause  acetaminophen (TYLENOL) oral suspension 650 mg    POCT rapid ANTIGEN strepA    amoxicillin (AMOXIL) 400 MG/5ML suspension        POCT strep +  Treated with amox (patient prefers liquid medication)  Vitals improved after tylenol in office  Discussed ED precautions in detail  Patient Instructions       Follow up with PCP in 3-5 days.  Proceed to  ER if symptoms worsen.    If tests have been performed at [facility], our office will contact you with results if changes need to be made to the care plan discussed with you at the visit.  [facility]    Chief Complaint     Chief Complaint   Patient presents with    Cold Like Symptoms     Pt. Felt fatigues yesterday. Today, she began to have headache, blurred vision, fever, chills, cough, sinus pressure.          History of Present Illness       One day of feeling sick  Sore throat, congestion, shortness of breath.  Today started with fever of sudden onset while driving  Also feels she has blurry vision, headache.         Review of Systems   Review of Systems   Constitutional:  Positive for activity change, fatigue and fever.   HENT:  Positive for congestion and sore throat.    Eyes:  Positive for visual disturbance.   Respiratory:  Positive for shortness of breath. Negative for cough and wheezing.    Cardiovascular:  Negative for chest pain.   Neurological:  Positive for headaches.         Current Medications       Current Outpatient Medications:     amoxicillin (AMOXIL) 400 MG/5ML suspension, Take 6.3 mL (500 mg total) by mouth 2 (two) times a day for 10 days, Disp: 126 mL, Rfl: 0    loratadine (CLARITIN) 10 mg tablet, Take 10 mg by mouth daily, Disp: , Rfl:      rizatriptan (MAXALT-MLT) 10 mg disintegrating tablet, Take 1 tablet (10 mg total) by mouth once as needed for migraine for up to 1 dose may repeat in 2 hours if necessary (Patient not taking: Reported on 2024), Disp: 10 tablet, Rfl: 1  No current facility-administered medications for this visit.    Current Allergies     Allergies as of 2024    (No Known Allergies)            The following portions of the patient's history were reviewed and updated as appropriate: allergies, current medications, past family history, past medical history, past social history, past surgical history and problem list.     Past Medical History:   Diagnosis Date    Allergic     Varicella        Past Surgical History:   Procedure Laterality Date    APPENDECTOMY  2006     SECTION      WISDOM TOOTH EXTRACTION         Family History   Problem Relation Age of Onset    Thyroid disease Mother     No Known Problems Father     Autoimmune disease Family     Thyroid disease Family     Thyroid disease Sister          Medications have been verified.        Objective   /86   Pulse (!) 124   Temp (!) 103.7 °F (39.8 °C)   Resp 18   Wt 98.9 kg (218 lb)   SpO2 96%   BMI 35.19 kg/m²   No LMP recorded.       Physical Exam     Physical Exam  Vitals reviewed.   Constitutional:       General: She is not in acute distress.     Appearance: Normal appearance. She is not ill-appearing, toxic-appearing or diaphoretic.   HENT:      Head: Normocephalic and atraumatic.      Right Ear: Tympanic membrane normal.      Left Ear: Tympanic membrane normal.      Nose: Congestion and rhinorrhea present.      Mouth/Throat:      Mouth: Mucous membranes are moist.      Pharynx: Oropharyngeal exudate and posterior oropharyngeal erythema present.   Eyes:      Pupils: Pupils are equal, round, and reactive to light.   Neck:      Comments: Neg kernig and brudzinski   Cardiovascular:      Rate and Rhythm: Normal rate and regular rhythm.      Heart sounds:  No murmur heard.  Pulmonary:      Effort: Pulmonary effort is normal. No respiratory distress.      Breath sounds: Normal breath sounds.   Abdominal:      General: Abdomen is flat.      Palpations: Abdomen is soft.      Tenderness: There is no abdominal tenderness.   Musculoskeletal:         General: Normal range of motion.      Cervical back: Normal range of motion. No rigidity or tenderness.   Lymphadenopathy:      Cervical: Cervical adenopathy present.   Skin:     General: Skin is warm and dry.      Capillary Refill: Capillary refill takes less than 2 seconds.   Neurological:      General: No focal deficit present.      Mental Status: She is alert and oriented to person, place, and time. Mental status is at baseline.   Psychiatric:         Mood and Affect: Mood normal.         Behavior: Behavior normal.         Thought Content: Thought content normal.

## 2024-11-08 ENCOUNTER — APPOINTMENT (EMERGENCY)
Dept: RADIOLOGY | Facility: HOSPITAL | Age: 39
End: 2024-11-08
Payer: COMMERCIAL

## 2024-11-08 ENCOUNTER — HOSPITAL ENCOUNTER (OUTPATIENT)
Facility: HOSPITAL | Age: 39
Setting detail: OBSERVATION
Discharge: HOME | End: 2024-11-10
Attending: EMERGENCY MEDICINE | Admitting: FAMILY MEDICINE
Payer: COMMERCIAL

## 2024-11-08 DIAGNOSIS — J02.0 STREP PHARYNGITIS: Primary | ICD-10-CM

## 2024-11-08 DIAGNOSIS — R50.9 FEVER OF UNKNOWN ORIGIN (FUO): ICD-10-CM

## 2024-11-08 DIAGNOSIS — R65.10 SIRS (SYSTEMIC INFLAMMATORY RESPONSE SYNDROME) (HCC): ICD-10-CM

## 2024-11-08 LAB
ALBUMIN SERPL BCG-MCNC: 4.3 G/DL (ref 3.5–5)
ALP SERPL-CCNC: 58 U/L (ref 34–104)
ALT SERPL W P-5'-P-CCNC: 24 U/L (ref 7–52)
ANION GAP SERPL CALCULATED.3IONS-SCNC: 9 MMOL/L (ref 4–13)
APTT PPP: 28 SECONDS (ref 23–34)
AST SERPL W P-5'-P-CCNC: 24 U/L (ref 13–39)
BACTERIA UR QL AUTO: ABNORMAL /HPF
BASOPHILS # BLD AUTO: 0.05 THOUSANDS/ÂΜL (ref 0–0.1)
BASOPHILS NFR BLD AUTO: 0 % (ref 0–1)
BILIRUB SERPL-MCNC: 0.62 MG/DL (ref 0.2–1)
BILIRUB UR QL STRIP: NEGATIVE
BUN SERPL-MCNC: 8 MG/DL (ref 5–25)
CALCIUM SERPL-MCNC: 9.3 MG/DL (ref 8.4–10.2)
CAOX CRY URNS QL MICRO: ABNORMAL /HPF
CARDIAC TROPONIN I PNL SERPL HS: <2 NG/L
CHLORIDE SERPL-SCNC: 102 MMOL/L (ref 96–108)
CLARITY UR: ABNORMAL
CO2 SERPL-SCNC: 23 MMOL/L (ref 21–32)
COLOR UR: YELLOW
CREAT SERPL-MCNC: 0.82 MG/DL (ref 0.6–1.3)
EOSINOPHIL # BLD AUTO: 0.01 THOUSAND/ÂΜL (ref 0–0.61)
EOSINOPHIL NFR BLD AUTO: 0 % (ref 0–6)
ERYTHROCYTE [DISTWIDTH] IN BLOOD BY AUTOMATED COUNT: 12.1 % (ref 11.6–15.1)
EXT PREGNANCY TEST URINE: NEGATIVE
EXT. CONTROL: NORMAL
FLUAV AG UPPER RESP QL IA.RAPID: NEGATIVE
FLUBV AG UPPER RESP QL IA.RAPID: NEGATIVE
GFR SERPL CREATININE-BSD FRML MDRD: 91 ML/MIN/1.73SQ M
GLUCOSE SERPL-MCNC: 139 MG/DL (ref 65–140)
GLUCOSE UR STRIP-MCNC: NEGATIVE MG/DL
HCT VFR BLD AUTO: 39.3 % (ref 34.8–46.1)
HGB BLD-MCNC: 13.3 G/DL (ref 11.5–15.4)
HGB UR QL STRIP.AUTO: ABNORMAL
IMM GRANULOCYTES # BLD AUTO: 0.05 THOUSAND/UL (ref 0–0.2)
IMM GRANULOCYTES NFR BLD AUTO: 0 % (ref 0–2)
INR PPP: 1.17 (ref 0.85–1.19)
KETONES UR STRIP-MCNC: ABNORMAL MG/DL
LACTATE SERPL-SCNC: 0.7 MMOL/L (ref 0.5–2)
LEUKOCYTE ESTERASE UR QL STRIP: ABNORMAL
LIPASE SERPL-CCNC: 22 U/L (ref 11–82)
LYMPHOCYTES # BLD AUTO: 1.42 THOUSANDS/ÂΜL (ref 0.6–4.47)
LYMPHOCYTES NFR BLD AUTO: 10 % (ref 14–44)
MCH RBC QN AUTO: 30.5 PG (ref 26.8–34.3)
MCHC RBC AUTO-ENTMCNC: 33.8 G/DL (ref 31.4–37.4)
MCV RBC AUTO: 90 FL (ref 82–98)
MONOCYTES # BLD AUTO: 1.18 THOUSAND/ÂΜL (ref 0.17–1.22)
MONOCYTES NFR BLD AUTO: 9 % (ref 4–12)
MUCOUS THREADS UR QL AUTO: ABNORMAL
NEUTROPHILS # BLD AUTO: 11 THOUSANDS/ÂΜL (ref 1.85–7.62)
NEUTS SEG NFR BLD AUTO: 81 % (ref 43–75)
NITRITE UR QL STRIP: NEGATIVE
NON-SQ EPI CELLS URNS QL MICRO: ABNORMAL /HPF
NRBC BLD AUTO-RTO: 0 /100 WBCS
PH UR STRIP.AUTO: 5.5 [PH]
PLATELET # BLD AUTO: 190 THOUSANDS/UL (ref 149–390)
PMV BLD AUTO: 9.9 FL (ref 8.9–12.7)
POTASSIUM SERPL-SCNC: 3.4 MMOL/L (ref 3.5–5.3)
PROCALCITONIN SERPL-MCNC: 0.15 NG/ML
PROT SERPL-MCNC: 7.4 G/DL (ref 6.4–8.4)
PROT UR STRIP-MCNC: ABNORMAL MG/DL
PROTHROMBIN TIME: 15.6 SECONDS (ref 12.3–15)
RBC # BLD AUTO: 4.36 MILLION/UL (ref 3.81–5.12)
RBC #/AREA URNS AUTO: ABNORMAL /HPF
SARS-COV+SARS-COV-2 AG RESP QL IA.RAPID: NEGATIVE
SODIUM SERPL-SCNC: 134 MMOL/L (ref 135–147)
SP GR UR STRIP.AUTO: 1.03 (ref 1–1.03)
UROBILINOGEN UR STRIP-ACNC: 3 MG/DL
WBC # BLD AUTO: 13.71 THOUSAND/UL (ref 4.31–10.16)
WBC #/AREA URNS AUTO: ABNORMAL /HPF

## 2024-11-08 PROCEDURE — 99223 1ST HOSP IP/OBS HIGH 75: CPT | Performed by: FAMILY MEDICINE

## 2024-11-08 PROCEDURE — 87468 ANAPLSMA PHGCYTOPHLM AMP PRB: CPT

## 2024-11-08 PROCEDURE — 85610 PROTHROMBIN TIME: CPT

## 2024-11-08 PROCEDURE — 85730 THROMBOPLASTIN TIME PARTIAL: CPT

## 2024-11-08 PROCEDURE — 87811 SARS-COV-2 COVID19 W/OPTIC: CPT

## 2024-11-08 PROCEDURE — 86618 LYME DISEASE ANTIBODY: CPT

## 2024-11-08 PROCEDURE — 81001 URINALYSIS AUTO W/SCOPE: CPT

## 2024-11-08 PROCEDURE — 80053 COMPREHEN METABOLIC PANEL: CPT

## 2024-11-08 PROCEDURE — 96365 THER/PROPH/DIAG IV INF INIT: CPT

## 2024-11-08 PROCEDURE — 84145 PROCALCITONIN (PCT): CPT

## 2024-11-08 PROCEDURE — 81025 URINE PREGNANCY TEST: CPT

## 2024-11-08 PROCEDURE — 99285 EMERGENCY DEPT VISIT HI MDM: CPT

## 2024-11-08 PROCEDURE — 96367 TX/PROPH/DG ADDL SEQ IV INF: CPT

## 2024-11-08 PROCEDURE — 93005 ELECTROCARDIOGRAM TRACING: CPT

## 2024-11-08 PROCEDURE — 84484 ASSAY OF TROPONIN QUANT: CPT

## 2024-11-08 PROCEDURE — 36415 COLL VENOUS BLD VENIPUNCTURE: CPT

## 2024-11-08 PROCEDURE — 83690 ASSAY OF LIPASE: CPT

## 2024-11-08 PROCEDURE — 83605 ASSAY OF LACTIC ACID: CPT

## 2024-11-08 PROCEDURE — 85025 COMPLETE CBC W/AUTO DIFF WBC: CPT

## 2024-11-08 PROCEDURE — 87804 INFLUENZA ASSAY W/OPTIC: CPT

## 2024-11-08 PROCEDURE — 71045 X-RAY EXAM CHEST 1 VIEW: CPT

## 2024-11-08 PROCEDURE — 87040 BLOOD CULTURE FOR BACTERIA: CPT

## 2024-11-08 RX ORDER — ACETAMINOPHEN 10 MG/ML
1000 INJECTION, SOLUTION INTRAVENOUS ONCE
Status: COMPLETED | OUTPATIENT
Start: 2024-11-08 | End: 2024-11-08

## 2024-11-08 RX ORDER — POTASSIUM CHLORIDE 1500 MG/1
20 TABLET, EXTENDED RELEASE ORAL ONCE
Status: COMPLETED | OUTPATIENT
Start: 2024-11-08 | End: 2024-11-08

## 2024-11-08 RX ADMIN — Medication 2000 MG: at 22:44

## 2024-11-08 RX ADMIN — SODIUM CHLORIDE 1000 ML: 0.9 INJECTION, SOLUTION INTRAVENOUS at 21:51

## 2024-11-08 RX ADMIN — ACETAMINOPHEN 1000 MG: 1000 INJECTION, SOLUTION INTRAVENOUS at 21:51

## 2024-11-08 RX ADMIN — POTASSIUM CHLORIDE 20 MEQ: 1500 TABLET, EXTENDED RELEASE ORAL at 22:44

## 2024-11-09 PROBLEM — A41.9 SEPSIS (HCC): Status: ACTIVE | Noted: 2024-11-09

## 2024-11-09 PROBLEM — E87.8 ELECTROLYTE ABNORMALITY: Status: ACTIVE | Noted: 2024-11-09

## 2024-11-09 PROBLEM — E66.9 OBESITY: Status: ACTIVE | Noted: 2024-11-09

## 2024-11-09 LAB
ANION GAP SERPL CALCULATED.3IONS-SCNC: 6 MMOL/L (ref 4–13)
ATRIAL RATE: 87 BPM
B BURGDOR IGG+IGM SER QL IA: NEGATIVE
BASOPHILS # BLD AUTO: 0.06 THOUSANDS/ÂΜL (ref 0–0.1)
BASOPHILS NFR BLD AUTO: 1 % (ref 0–1)
BUN SERPL-MCNC: 7 MG/DL (ref 5–25)
CALCIUM SERPL-MCNC: 8.7 MG/DL (ref 8.4–10.2)
CARDIAC TROPONIN I PNL SERPL HS: <2 NG/L
CHLORIDE SERPL-SCNC: 107 MMOL/L (ref 96–108)
CO2 SERPL-SCNC: 24 MMOL/L (ref 21–32)
CREAT SERPL-MCNC: 0.63 MG/DL (ref 0.6–1.3)
EOSINOPHIL # BLD AUTO: 0.11 THOUSAND/ÂΜL (ref 0–0.61)
EOSINOPHIL NFR BLD AUTO: 1 % (ref 0–6)
ERYTHROCYTE [DISTWIDTH] IN BLOOD BY AUTOMATED COUNT: 12.2 % (ref 11.6–15.1)
GFR SERPL CREATININE-BSD FRML MDRD: 114 ML/MIN/1.73SQ M
GLUCOSE P FAST SERPL-MCNC: 96 MG/DL (ref 65–99)
GLUCOSE SERPL-MCNC: 96 MG/DL (ref 65–140)
HCT VFR BLD AUTO: 37.9 % (ref 34.8–46.1)
HGB BLD-MCNC: 12.7 G/DL (ref 11.5–15.4)
IMM GRANULOCYTES # BLD AUTO: 0.03 THOUSAND/UL (ref 0–0.2)
IMM GRANULOCYTES NFR BLD AUTO: 0 % (ref 0–2)
LYMPHOCYTES # BLD AUTO: 2.18 THOUSANDS/ÂΜL (ref 0.6–4.47)
LYMPHOCYTES NFR BLD AUTO: 19 % (ref 14–44)
MCH RBC QN AUTO: 30.8 PG (ref 26.8–34.3)
MCHC RBC AUTO-ENTMCNC: 33.5 G/DL (ref 31.4–37.4)
MCV RBC AUTO: 92 FL (ref 82–98)
MONOCYTES # BLD AUTO: 1.13 THOUSAND/ÂΜL (ref 0.17–1.22)
MONOCYTES NFR BLD AUTO: 10 % (ref 4–12)
NEUTROPHILS # BLD AUTO: 8.18 THOUSANDS/ÂΜL (ref 1.85–7.62)
NEUTS SEG NFR BLD AUTO: 69 % (ref 43–75)
NRBC BLD AUTO-RTO: 0 /100 WBCS
P AXIS: 58 DEGREES
PLATELET # BLD AUTO: 175 THOUSANDS/UL (ref 149–390)
PMV BLD AUTO: 9.8 FL (ref 8.9–12.7)
POTASSIUM SERPL-SCNC: 3.9 MMOL/L (ref 3.5–5.3)
PR INTERVAL: 144 MS
QRS AXIS: 72 DEGREES
QRSD INTERVAL: 92 MS
QT INTERVAL: 350 MS
QTC INTERVAL: 421 MS
RBC # BLD AUTO: 4.12 MILLION/UL (ref 3.81–5.12)
SODIUM SERPL-SCNC: 137 MMOL/L (ref 135–147)
T WAVE AXIS: 41 DEGREES
TSH SERPL DL<=0.05 MIU/L-ACNC: 1.88 UIU/ML (ref 0.45–4.5)
VENTRICULAR RATE: 87 BPM
WBC # BLD AUTO: 11.69 THOUSAND/UL (ref 4.31–10.16)

## 2024-11-09 PROCEDURE — 87207 SMEAR SPECIAL STAIN: CPT | Performed by: FAMILY MEDICINE

## 2024-11-09 PROCEDURE — 99233 SBSQ HOSP IP/OBS HIGH 50: CPT | Performed by: INTERNAL MEDICINE

## 2024-11-09 PROCEDURE — 84443 ASSAY THYROID STIM HORMONE: CPT | Performed by: FAMILY MEDICINE

## 2024-11-09 PROCEDURE — 93010 ELECTROCARDIOGRAM REPORT: CPT | Performed by: INTERNAL MEDICINE

## 2024-11-09 PROCEDURE — 93005 ELECTROCARDIOGRAM TRACING: CPT

## 2024-11-09 PROCEDURE — 80048 BASIC METABOLIC PNL TOTAL CA: CPT | Performed by: FAMILY MEDICINE

## 2024-11-09 PROCEDURE — 85025 COMPLETE CBC W/AUTO DIFF WBC: CPT | Performed by: FAMILY MEDICINE

## 2024-11-09 PROCEDURE — 84484 ASSAY OF TROPONIN QUANT: CPT | Performed by: FAMILY MEDICINE

## 2024-11-09 RX ORDER — IBUPROFEN 100 MG/5ML
600 SUSPENSION ORAL EVERY 6 HOURS PRN
Status: DISCONTINUED | OUTPATIENT
Start: 2024-11-09 | End: 2024-11-09

## 2024-11-09 RX ORDER — SODIUM CHLORIDE 9 MG/ML
100 INJECTION, SOLUTION INTRAVENOUS ONCE
Status: COMPLETED | OUTPATIENT
Start: 2024-11-09 | End: 2024-11-10

## 2024-11-09 RX ORDER — METOCLOPRAMIDE HYDROCHLORIDE 5 MG/ML
10 INJECTION INTRAMUSCULAR; INTRAVENOUS EVERY 8 HOURS SCHEDULED
Status: DISCONTINUED | OUTPATIENT
Start: 2024-11-09 | End: 2024-11-10

## 2024-11-09 RX ORDER — LORATADINE ORAL 5 MG/5ML
10 SOLUTION ORAL DAILY
Status: DISCONTINUED | OUTPATIENT
Start: 2024-11-09 | End: 2024-11-10 | Stop reason: HOSPADM

## 2024-11-09 RX ORDER — DIPHENHYDRAMINE HYDROCHLORIDE 50 MG/ML
25 INJECTION INTRAMUSCULAR; INTRAVENOUS EVERY 8 HOURS SCHEDULED
Status: DISCONTINUED | OUTPATIENT
Start: 2024-11-09 | End: 2024-11-10

## 2024-11-09 RX ORDER — SUMATRIPTAN 6 MG/.5ML
6 INJECTION, SOLUTION SUBCUTANEOUS ONCE
Status: COMPLETED | OUTPATIENT
Start: 2024-11-09 | End: 2024-11-09

## 2024-11-09 RX ORDER — ACETAMINOPHEN 10 MG/ML
1000 INJECTION, SOLUTION INTRAVENOUS EVERY 6 HOURS PRN
Status: DISCONTINUED | OUTPATIENT
Start: 2024-11-09 | End: 2024-11-10 | Stop reason: HOSPADM

## 2024-11-09 RX ORDER — AMOXICILLIN 250 MG/5ML
875 POWDER, FOR SUSPENSION ORAL EVERY 12 HOURS SCHEDULED
Status: DISCONTINUED | OUTPATIENT
Start: 2024-11-09 | End: 2024-11-09

## 2024-11-09 RX ORDER — MAGNESIUM SULFATE HEPTAHYDRATE 40 MG/ML
2 INJECTION, SOLUTION INTRAVENOUS EVERY 24 HOURS
Status: DISCONTINUED | OUTPATIENT
Start: 2024-11-09 | End: 2024-11-10

## 2024-11-09 RX ORDER — LORATADINE 10 MG/1
10 TABLET ORAL DAILY
Status: DISCONTINUED | OUTPATIENT
Start: 2024-11-09 | End: 2024-11-09

## 2024-11-09 RX ADMIN — DIPHENHYDRAMINE HYDROCHLORIDE 25 MG: 50 INJECTION, SOLUTION INTRAMUSCULAR; INTRAVENOUS at 17:30

## 2024-11-09 RX ADMIN — SUMATRIPTAN 6 MG: 6 INJECTION, SOLUTION SUBCUTANEOUS at 17:34

## 2024-11-09 RX ADMIN — AMPICILLIN SODIUM AND SULBACTAM SODIUM 3 G: 100; 50 INJECTION, POWDER, FOR SOLUTION INTRAVENOUS at 20:44

## 2024-11-09 RX ADMIN — AMOXICILLIN 875 MG: 250 POWDER, FOR SUSPENSION ORAL at 08:52

## 2024-11-09 RX ADMIN — AMPICILLIN SODIUM AND SULBACTAM SODIUM 3 G: 100; 50 INJECTION, POWDER, FOR SOLUTION INTRAVENOUS at 13:33

## 2024-11-09 RX ADMIN — ACETAMINOPHEN 1000 MG: 10 INJECTION INTRAVENOUS at 09:17

## 2024-11-09 RX ADMIN — Medication 1 SPRAY: at 09:22

## 2024-11-09 RX ADMIN — MAGNESIUM SULFATE HEPTAHYDRATE 2 G: 40 INJECTION, SOLUTION INTRAVENOUS at 17:30

## 2024-11-09 RX ADMIN — LORATADINE 10 MG: 5 SOLUTION ORAL at 09:13

## 2024-11-09 RX ADMIN — METOCLOPRAMIDE 10 MG: 5 INJECTION, SOLUTION INTRAMUSCULAR; INTRAVENOUS at 17:30

## 2024-11-09 RX ADMIN — SODIUM CHLORIDE 100 ML/HR: 9 INJECTION, SOLUTION INTRAVENOUS at 17:35

## 2024-11-09 RX ADMIN — ACETAMINOPHEN 1000 MG: 10 INJECTION INTRAVENOUS at 20:42

## 2024-11-09 NOTE — H&P
H&P - Hospitalist   Name: Holli Driver 39 y.o. female I MRN: [id number]  Unit/Bed#: ED 12 I Date of Admission: 11/8/2024   Date of Service: 11/8/2024 I Hospital Day: 0     Assessment & Plan  Fever  Patient admitted with fevers for couple of days.  Persistent not responsive to Tylenol or ibuprofen at home.  No obvious source here.  Chest normal urine looked good no abdominal pain.  He did have a rapid strep that was positive although she has no symptoms and her throat exam is unremarkable.  Blood cultures been sent off the sent off a tick panel await blood cultures urine look good blood cultures were sent.  On then continue to treat her strep throat double down on antipyretics and follow her over the next 24 hours  Strep pharyngitis  As noted above patient presented to the urgent care center with a fever and was diagnosed with strep pharyngitis and started on amoxicillin.  Had fevers despite the amoxicillin so came to the emergency room.  Will continue her amoxicillin here was given 1 dose of Rocephin in the emergency room      VTE Pharmacologic Prophylaxis:   Low Risk (Score 0-2) - Encourage Ambulation.  Code Status: No Order full  Discussion with family: Updated  (pt) at bedside.    Anticipated Length of Stay: Patient will be admitted on an observation basis with an anticipated length of stay of less than 2 midnights secondary to fever.    History of Present Illness   Chief Complaint: Fever    Holli Driver is a 39 y.o. female with a PMH of rapid strep positive who presents with for.  Patient reports being generally healthy over the past couple days though she has had intermittent fevers.  Went to the care center couple days ago had a rapid strep test that was positive was started on amoxicillin.  Despite that fact has had intermittent fevers for the past 36 hours.  Not responsive to Tylenol and Motrin at.  She denies any localized symptoms such as sore throat,  abdominal pain, dysuria, no ulcers.  She travels a bit and goes outdoors a lot but she denies any rashes.  In the ED she is given some volume, was given antipyretics which helped a ton blood cultures and urine cultures were sent.  Decision is made to admit her overnight keep an eye on her antipyretics for fever and likely restart her amoxicillin in the morning..    Review of Systems   Constitutional:  Positive for activity change, chills, fatigue and fever. Negative for appetite change, diaphoresis and unexpected weight change.   HENT:  Positive for tinnitus. Negative for congestion, dental problem, drooling, ear pain, hearing loss, mouth sores, nosebleeds, sinus pressure, sneezing, sore throat, trouble swallowing and voice change.    Eyes:  Negative for pain, discharge and redness.   Respiratory:  Negative for apnea, cough, choking and chest tightness.    Cardiovascular:  Positive for palpitations. Negative for chest pain and leg swelling.   Gastrointestinal:  Negative for abdominal distention and abdominal pain.   Endocrine: Negative for polydipsia.   Genitourinary:  Negative for dyspareunia, dysuria, enuresis and frequency.   Musculoskeletal:  Negative for arthralgias, back pain and gait problem.   Neurological:  Negative for dizziness, seizures, syncope, facial asymmetry, speech difficulty, weakness, light-headedness, numbness and headaches.   Hematological:  Negative for adenopathy.   Psychiatric/Behavioral:  Negative for agitation, behavioral problems, confusion and decreased concentration.        Historical Information   Past Medical History:   Diagnosis Date    Allergic     Varicella      Past Surgical History:   Procedure Laterality Date    APPENDECTOMY  2006     SECTION      WISDOM TOOTH EXTRACTION       Social History     Tobacco Use    Smoking status: Never    Smokeless tobacco: Never   Vaping Use    Vaping status: Never Used   Substance and Sexual Activity    Alcohol use: Yes     Alcohol/week:  2.0 standard drinks of alcohol     Types: 2 Cans of beer per week     Comment: 1x/week    Drug use: No    Sexual activity: Yes     Partners: Male     Birth control/protection: None     E-Cigarette/Vaping    E-Cigarette Use Never User      E-Cigarette/Vaping Substances    Nicotine No     THC No     CBD No     Flavoring No     Other No     Unknown No        Social History:  Marital Status:    Occupation: Makes baseball uniform  Patient Pre-hospital Living Situation: Home  Patient Pre-hospital Level of Mobility: walks  Patient Pre-hospital Diet Restrictions: None    Meds/Allergies   I have reviewed home medications with patient personally.  Prior to Admission medications    Medication Sig Start Date End Date Taking? Authorizing Provider   amoxicillin (AMOXIL) 400 MG/5ML suspension Take 6.3 mL (500 mg total) by mouth 2 (two) times a day for 10 days 11/7/24 11/17/24  Lawanda Green DO   loratadine (CLARITIN) 10 mg tablet Take 10 mg by mouth daily    Historical Provider, MD mtzzatriptan (MAXALT-MLT) 10 mg disintegrating tablet Take 1 tablet (10 mg total) by mouth once as needed for migraine for up to 1 dose may repeat in 2 hours if necessary  Patient not taking: Reported on 11/7/2024 4/21/23   Amrita Solomon DO     No Known Allergies    Objective :  Temp:  [99.4 °F (37.4 °C)-102.4 °F (39.1 °C)] 99.4 °F (37.4 °C)  HR:  [] 88  BP: (119-139)/(69-84) 119/74  Resp:  [16-18] 18  SpO2:  [95 %-96 %] 95 %  O2 Device: None (Room air)    Physical Exam  Constitutional:       General: She is not in acute distress.     Appearance: Normal appearance. She is not ill-appearing.   HENT:      Head: Normocephalic and atraumatic.      Right Ear: Tympanic membrane normal.      Left Ear: Tympanic membrane normal.      Nose: Nose normal. No congestion or rhinorrhea.      Mouth/Throat:      Mouth: Mucous membranes are dry.      Pharynx: No oropharyngeal exudate or posterior oropharyngeal erythema.   Eyes:      General:          Right eye: No discharge.         Left eye: No discharge.      Extraocular Movements: Extraocular movements intact.      Pupils: Pupils are equal, round, and reactive to light.   Cardiovascular:      Rate and Rhythm: Normal rate and regular rhythm.      Pulses: Normal pulses.      Heart sounds: No murmur heard.     Friction rub present.   Pulmonary:      Effort: Pulmonary effort is normal. No respiratory distress.      Breath sounds: Normal breath sounds. No stridor. No wheezing or rhonchi.   Abdominal:      General: Abdomen is flat. There is distension.      Palpations: Abdomen is soft. There is no mass.   Musculoskeletal:         General: No swelling or tenderness. Normal range of motion.      Cervical back: Normal range of motion. No rigidity or tenderness.      Right lower leg: No edema.      Left lower leg: No edema.   Skin:     General: Skin is warm.      Capillary Refill: Capillary refill takes less than 2 seconds.      Coloration: Skin is not jaundiced or pale.   Neurological:      General: No focal deficit present.      Mental Status: She is alert and oriented to person, place, and time.      Cranial Nerves: No cranial nerve deficit.      Sensory: No sensory deficit.   Psychiatric:         Mood and Affect: Mood normal.          Lines/Drains:            Lab Results: I have reviewed the following results:  Results from last 7 days   Lab Units 11/08/24 2149   WBC Thousand/uL 13.71*   HEMOGLOBIN g/dL 13.3   HEMATOCRIT % 39.3   PLATELETS Thousands/uL 190   SEGS PCT % 81*   LYMPHO PCT % 10*   MONO PCT % 9   EOS PCT % 0     Results from last 7 days   Lab Units 11/08/24 2149   SODIUM mmol/L 134*   POTASSIUM mmol/L 3.4*   CHLORIDE mmol/L 102   CO2 mmol/L 23   BUN mg/dL 8   CREATININE mg/dL 0.82   ANION GAP mmol/L 9   CALCIUM mg/dL 9.3   ALBUMIN g/dL 4.3   TOTAL BILIRUBIN mg/dL 0.62   ALK PHOS U/L 58   ALT U/L 24   AST U/L 24   GLUCOSE RANDOM mg/dL 139     Results from last 7 days   Lab Units 11/08/24 2149  "  INR  1.17         No results found for: \"HGBA1C\"  Results from last 7 days   Lab Units 11/08/24  2150 11/08/24  2149   LACTIC ACID mmol/L 0.7  --    PROCALCITONIN ng/ml  --  0.15       Imaging Results Review: I personally reviewed the following image studies/reports in PACS and discussed pertinent findings with Radiology: chest xray. My interpretation of the radiology images/reports is:  .      Administrative Statements       ** Please Note: This note has been constructed using a voice recognition system. **    "

## 2024-11-09 NOTE — ED PROVIDER NOTES
Time reflects when diagnosis was documented in both MDM as applicable and the Disposition within this note       Time User Action Codes Description Comment    11/8/2024 10:42 PM Kaia Sunshine Add [R65.10] SIRS (systemic inflammatory response syndrome) (HCC)     11/8/2024 10:42 PM Kaia Sunshine Add [R50.9] Fever of unknown origin (FUO)           ED Disposition       ED Disposition   Admit    Condition   Stable    Date/Time   Fri Nov 8, 2024 10:42 PM    Comment   Case was discussed with Dr. Wetzel and the patient's admission status was agreed to be Admission Status: observation status to the service of Dr. Wetzel .               Assessment & Plan       Medical Decision Making  Today, febrile and tachycardic on arrival. Ill-appearing on exam. Leukocytosis, 13.71. Potassium 3.4, repleted p.o. Small blood and trace leukocytes on UA, negative nitrates. Lactic acid and procalcitonin negative. CXR with no acute abnormalities. Pending blood cultures. Pending tick panel- patient reports that she spends a lot of time outdoors. Started IV fluids, antibiotics and acetaminophen in the ER. Discussed with internal medicine for admission.  Discussed ED course and results with patient.  Patient understands and consents to admission.    Amount and/or Complexity of Data Reviewed  Labs: ordered. Decision-making details documented in ED Course.  Radiology: ordered.    Risk  Prescription drug management.  Decision regarding hospitalization.        ED Course as of 11/09/24 0557   Fri Nov 08, 2024   2204 Blood, UA(!): Small   2204 Leukocytes, UA(!): Trace   2204 Nitrite, UA: Negative   2212 Potassium(!): 3.4   2214 LACTIC ACID: 0.7       Medications   sodium chloride 0.9 % bolus 1,000 mL (1,000 mL Intravenous New Bag 11/8/24 2151)   acetaminophen (Ofirmev) injection 1,000 mg (0 mg Intravenous Stopped 11/8/24 2231)   ceftriaxone (ROCEPHIN) 2 g/50 mL in dextrose IVPB (2,000 mg Intravenous New Bag 11/8/24 2244)   potassium chloride (Klor-Con  M20) CR tablet 20 mEq (20 mEq Oral Given 24 2244)       ED Risk Strat Scores   HEART Risk Score      Flowsheet Row Most Recent Value   Heart Score Risk Calculator    History 0 Filed at: 2024   ECG 0 Filed at: 2024   Age 0 Filed at: 2024   Risk Factors 1 Filed at: 2024   Troponin 0 Filed at: 2024   HEART Score 1 Filed at: 2024                               SBIRT 22yo+      Flowsheet Row Most Recent Value   Initial Alcohol Screen: US AUDIT-C     1. How often do you have a drink containing alcohol? 0 Filed at: 2024   2. How many drinks containing alcohol do you have on a typical day you are drinking?  0 Filed at: 2024   3b. FEMALE Any Age, or MALE 65+: How often do you have 4 or more drinks on one occassion? 0 Filed at: 2024   Audit-C Score 0 Filed at: 2024   VERENICE: How many times in the past year have you...    Used an illegal drug or used a prescription medication for non-medical reasons? Never Filed at: 2024                            History of Present Illness       Chief Complaint   Patient presents with    Fever     Patient co fever that she has not been able to break with alternating with Tylenol and ibuprofen x 2 days. Patient recently dx with strep and was given amoxicillin.        Past Medical History:   Diagnosis Date    Allergic     Varicella       Past Surgical History:   Procedure Laterality Date    APPENDECTOMY  2006     SECTION      WISDOM TOOTH EXTRACTION        Family History   Problem Relation Age of Onset    Thyroid disease Mother     No Known Problems Father     Autoimmune disease Family     Thyroid disease Family     Thyroid disease Sister       Social History     Tobacco Use    Smoking status: Never    Smokeless tobacco: Never   Vaping Use    Vaping status: Never Used   Substance Use Topics    Alcohol use: Yes     Alcohol/week: 2.0 standard drinks of alcohol      Types: 2 Cans of beer per week     Comment: 1x/week    Drug use: No      E-Cigarette/Vaping    E-Cigarette Use Never User       E-Cigarette/Vaping Substances    Nicotine No     THC No     CBD No     Flavoring No     Other No     Unknown No       I have reviewed and agree with the history as documented.     Patient is a 39-year-old female who presents to the emergency room with her  at bedside for fevers. Patient reports fever, headache, cough, palpitations and myalgias x2 days. Patient was seen in urgent care yesterday for fever, reports strep + and started on amoxicillin. However, patient denies any sore throat over the past x2 days. Review of urgent care visit yesterday shows that she was febrile to 103.7F and tachycardic, 124 bpm.  Denies any other symptoms.          Review of Systems   Constitutional:  Positive for fever. Negative for chills.   HENT:  Negative for ear pain, sore throat, trouble swallowing and voice change.    Eyes:  Negative for pain and visual disturbance.   Respiratory:  Positive for cough. Negative for shortness of breath.    Cardiovascular:  Positive for palpitations. Negative for chest pain.   Gastrointestinal:  Negative for abdominal pain and vomiting.   Genitourinary:  Negative for dysuria, flank pain and hematuria.   Musculoskeletal:  Positive for myalgias. Negative for arthralgias and back pain.   Skin:  Negative for color change and rash.   Neurological:  Positive for headaches. Negative for seizures and syncope.   Psychiatric/Behavioral:  Negative for confusion.    All other systems reviewed and are negative.          Objective       ED Triage Vitals   Temperature Pulse Blood Pressure Respirations SpO2 Patient Position - Orthostatic VS   11/08/24 2048 11/08/24 2048 11/08/24 2048 11/08/24 2048 11/08/24 2048 11/08/24 2048   (!) 102.4 °F (39.1 °C) (!) 118 128/69 17 95 % Sitting      Temp Source Heart Rate Source BP Location FiO2 (%) Pain Score    11/08/24 2048 11/08/24 2048  11/08/24 2048 -- 11/09/24 0016    Oral Monitor Left arm  No Pain      Vitals      Date and Time Temp Pulse SpO2 Resp BP Pain Score FACES Pain Rating User   11/09/24 0016 -- -- -- 18 -- No Pain -- AD   11/09/24 0016 98.3 °F (36.8 °C) 79 97 % -- 114/71 -- -- DII   11/08/24 2330 -- 80 95 % 19 119/70 -- --    11/08/24 2300 -- 88 95 % 18 119/74 -- --    11/08/24 2230 99.4 °F (37.4 °C) 89 95 % 18 122/71 -- --    11/08/24 2145 -- 109 96 % 16 139/84 -- --    11/08/24 2048 102.4 °F (39.1 °C) 118 95 % 17 128/69 -- -- AC            Physical Exam  Vitals and nursing note reviewed.   Constitutional:       General: She is not in acute distress.     Appearance: She is well-developed. She is ill-appearing.   HENT:      Head: Normocephalic and atraumatic.      Right Ear: Tympanic membrane, ear canal and external ear normal.      Left Ear: Tympanic membrane, ear canal and external ear normal.      Nose: Nose normal.      Mouth/Throat:      Mouth: Mucous membranes are moist.      Pharynx: Oropharynx is clear.   Eyes:      Conjunctiva/sclera: Conjunctivae normal.   Cardiovascular:      Rate and Rhythm: Regular rhythm. Tachycardia present.      Pulses: Normal pulses.      Heart sounds: No murmur heard.  Pulmonary:      Effort: Pulmonary effort is normal. No respiratory distress.      Breath sounds: Normal breath sounds.   Abdominal:      Palpations: Abdomen is soft.      Tenderness: There is no abdominal tenderness.   Musculoskeletal:         General: No swelling.      Cervical back: Neck supple.   Skin:     General: Skin is warm and dry.      Capillary Refill: Capillary refill takes less than 2 seconds.   Neurological:      General: No focal deficit present.      Mental Status: She is alert and oriented to person, place, and time.   Psychiatric:         Mood and Affect: Mood normal.         Results Reviewed       Procedure Component Value Units Date/Time    HS Troponin I 2hr [[id number]] Collected: 11/09/24 0125    Lab Status:  Final result Specimen: Blood from Hand, Right Updated: 11/09/24 0217     hs TnI 2hr <2 ng/L      Delta 2hr hsTnI --    HS Troponin I 4hr [[id number]] Updated: 11/09/24 0212    Lab Status: No result Specimen: Blood from Hand, Right     Blood Parasite smear [[id number]] Collected: 11/09/24 0125    Lab Status: In process Specimen: Blood from Hand, Right Updated: 11/09/24 0147    Procalcitonin [[id number]]  (Normal) Collected: 11/08/24 2149    Lab Status: Final result Specimen: Blood Updated: 11/08/24 2220     Procalcitonin 0.15 ng/ml     HS Troponin 0hr (reflex protocol) [[id number]]  (Normal) Collected: 11/08/24 2149    Lab Status: Final result Specimen: Blood Updated: 11/08/24 2218     hs TnI 0hr <2 ng/L     Urine Microscopic [[id number]]  (Abnormal) Collected: 11/08/24 2137    Lab Status: Final result Specimen: Urine, Clean Catch Updated: 11/08/24 2214     RBC, UA 2-4 /hpf      WBC, UA 4-10 /hpf      Epithelial Cells Moderate /hpf      Bacteria, UA Occasional /hpf      MUCUS THREADS Occasional     Ca Oxalate Darleen, UA Moderate /hpf     Comprehensive metabolic panel [[id number]]  (Abnormal) Collected: 11/08/24 2149    Lab Status: Final result Specimen: Blood Updated: 11/08/24 2211     Sodium 134 mmol/L      Potassium 3.4 mmol/L      Chloride 102 mmol/L      CO2 23 mmol/L      ANION GAP 9 mmol/L      BUN 8 mg/dL      Creatinine 0.82 mg/dL      Glucose 139 mg/dL      Calcium 9.3 mg/dL      AST 24 U/L      ALT 24 U/L      Alkaline Phosphatase 58 U/L      Total Protein 7.4 g/dL      Albumin 4.3 g/dL      Total Bilirubin 0.62 mg/dL      eGFR 91 ml/min/1.73sq m     Narrative:      [employer] Kid[employer] guidelines for Chronic Kidney Disease (CKD):     Stage 1 with normal or high GFR (GFR > 90 mL/min/1.73 square meters)    Stage 2 Mild CKD (GFR = 60-89 mL/min/1.73 square meters)    Stage 3A Moderate CKD (GFR = 45-59 mL/min/1.73 square meters)    Stage 3B Moderate CKD (GFR = 30-44 mL/min/1.73 square meters)    Stage 4  Severe CKD (GFR = 15-29 mL/min/1.73 square meters)    Stage 5 End Stage CKD (GFR <15 mL/min/1.73 square meters)  Note: GFR calculation is accurate only with a steady state creatinine    Lipase [[id number]]  (Normal) Collected: 11/08/24 2149    Lab Status: Final result Specimen: Blood Updated: 11/08/24 2211     Lipase 22 u/L     Protime-INR [[id number]]  (Abnormal) Collected: 11/08/24 2149    Lab Status: Final result Specimen: Blood Updated: 11/08/24 2211     Protime 15.6 seconds      INR 1.17    Narrative:      INR Therapeutic Range    Indication                                             INR Range      Atrial Fibrillation                                               2.0-3.0  Hypercoagulable State                                    2.0.2.3  Left Ventricular Asist Device                            2.0-3.0  Mechanical Heart Valve                                  -    Aortic(with afib, MI, embolism, HF, LA enlargement,    and/or coagulopathy)                                     2.0-3.0 (2.5-3.5)     Mitral                                                             2.5-3.5  Prosthetic/Bioprosthetic Heart Valve               2.0-3.0  Venous thromboembolism (VTE: VT, PE        2.0-3.0    APTT [[id number]]  (Normal) Collected: 11/08/24 2149    Lab Status: Final result Specimen: Blood Updated: 11/08/24 2211     PTT 28 seconds     Lactic acid [[id number]]  (Normal) Collected: 11/08/24 2150    Lab Status: Final result Specimen: Blood Updated: 11/08/24 2210     LACTIC ACID 0.7 mmol/L     Narrative:      Result may be elevated if tourniquet was used during collection.    FLU/COVID Rapid Antigen (30 min. TAT) - Preferred screening test in ED [[id number]]  (Normal) Collected: 11/08/24 2149    Lab Status: Final result Specimen: Nares from Nose Updated: 11/08/24 2210     SARS COV Rapid Antigen Negative     Influenza A Rapid Antigen Negative     Influenza B Rapid Antigen Negative    Narrative:      This test has been performed using  the [employer] Anne-Marie 2 FLU+SARS Antigen test under the Emergency Use Authorization (EUA). This test has been validated by the  and verified by the performing laboratory. The Anne-Marie uses lateral flow immunofluorescent sandwich assay to detect SARS-COV, Influenza A and Influenza B Antigen.     The Qu[employer] Anne-Marie 2 SARS Antigen test does not differentiate between SARS-CoV and SARS-CoV-2.     Negative results are presumptive and may be confirmed with a molecular assay, if necessary, for patient management. Negative results do not rule out SARS-CoV-2 or influenza infection and should not be used as the sole basis for treatment or patient management decisions. A negative test result may occur if the level of antigen in a sample is below the limit of detection of this test.     Positive results are indicative of the presence of viral antigens, but do not rule out bacterial infection or co-infection with other viruses.     All test results should be used as an adjunct to clinical observations and other information available to the provider.    FOR PEDIATRIC PATIENTS - copy/paste COVID Guidelines URL to browser: [email or web address]    UA w Reflex to Microscopic w Reflex to Culture [[id number]]  (Abnormal) Collected: 11/08/24 2137    Lab Status: Final result Specimen: Urine, Clean Catch Updated: 11/08/24 2157     Color, UA Yellow     Clarity, UA Turbid     Specific Gravity, UA 1.027     pH, UA 5.5     Leukocytes, UA Trace     Nitrite, UA Negative     Protein, UA Trace mg/dl      Glucose, UA Negative mg/dl      Ketones, UA 60 (2+) mg/dl      Urobilinogen, UA 3.0 mg/dl      Bilirubin, UA Negative     Occult Blood, UA Small    CBC and differential [[id number]]  (Abnormal) Collected: 11/08/24 2149    Lab Status: Final result Specimen: Blood Updated: 11/08/24 2152     WBC 13.71 Thousand/uL      RBC 4.36 Million/uL      Hemoglobin 13.3 g/dL      Hematocrit 39.3 %      MCV 90  fL      MCH 30.5 pg      MCHC 33.8 g/dL      RDW 12.1 %      MPV 9.9 fL      Platelets 190 Thousands/uL      nRBC 0 /100 WBCs      Segmented % 81 %      Immature Grans % 0 %      Lymphocytes % 10 %      Monocytes % 9 %      Eosinophils Relative 0 %      Basophils Relative 0 %      Absolute Neutrophils 11.00 Thousands/µL      Absolute Immature Grans 0.05 Thousand/uL      Absolute Lymphocytes 1.42 Thousands/µL      Absolute Monocytes 1.18 Thousand/µL      Eosinophils Absolute 0.01 Thousand/µL      Basophils Absolute 0.05 Thousands/µL     POCT pregnancy, urine [[id number]]  (Normal) Resulted: 11/08/24 2150    Lab Status: Final result Specimen: Urine Updated: 11/08/24 2150     EXT Preg Test, Ur Negative     Control Valid    Blood culture #2 [[id number]] Collected: 11/08/24 2150    Lab Status: In process Specimen: Blood Updated: 11/08/24 2150    Blood culture #1 [[id number]] Collected: 11/08/24 2150    Lab Status: In process Specimen: Blood Updated: 11/08/24 2150    Anaplasma Phagocytophilum, PCR [[id number]] Collected: 11/08/24 2149    Lab Status: In process Specimen: Blood Updated: 11/08/24 2149    LYME TOTAL AB W REFLEX TO IGM/IGG [[id number]] Collected: 11/08/24 2149    Lab Status: In process Specimen: Blood Updated: 11/08/24 2149    Narrative:      The following orders were created for panel order LYME TOTAL AB W REFLEX TO IGM/IGG.  Procedure                               Abnormality         Status                     ---------                               -----------         ------                     Lyme Total AB W Reflex t...[[id number]]                      In process                   Please view results for these tests on the individual orders.    Lyme Total AB W Reflex to IGM/IGG [[id number]] Collected: 11/08/24 2149    Lab Status: In process Specimen: Blood Updated: 11/08/24 2149            XR chest portable    (Results Pending)       ECG 12 Lead Documentation Only    Date/Time: 11/8/2024 10:29 PM    Performed  by: Kaia Sunshine PA-C  Authorized by: Kaia Sunshine PA-C    Indications / Diagnosis:  Cardiac work-up  ECG reviewed by me, the ED Provider: yes    Patient location:  ED  Interpretation:     Interpretation: normal    Rate:     ECG rate:  87    ECG rate assessment: normal    Rhythm:     Rhythm: sinus rhythm    ST segments:     ST segments:  Normal  T waves:     T waves: normal        ED Medication and Procedure Management   Prior to Admission Medications   Prescriptions Last Dose Informant Patient Reported? Taking?   amoxicillin (AMOXIL) 400 MG/5ML suspension   No No   Sig: Take 6.3 mL (500 mg total) by mouth 2 (two) times a day for 10 days   loratadine (CLARITIN) 10 mg tablet  Self Yes No   Sig: Take 10 mg by mouth daily   rizatriptan (MAXALT-MLT) 10 mg disintegrating tablet   No No   Sig: Take 1 tablet (10 mg total) by mouth once as needed for migraine for up to 1 dose may repeat in 2 hours if necessary   Patient not taking: Reported on 11/7/2024      Facility-Administered Medications: None     Current Discharge Medication List        CONTINUE these medications which have NOT CHANGED    Details   amoxicillin (AMOXIL) 400 MG/5ML suspension Take 6.3 mL (500 mg total) by mouth 2 (two) times a day for 10 days  Qty: 126 mL, Refills: 0    Associated Diagnoses: Fever, unspecified fever cause      loratadine (CLARITIN) 10 mg tablet Take 10 mg by mouth daily      rizatriptan (MAXALT-MLT) 10 mg disintegrating tablet Take 1 tablet (10 mg total) by mouth once as needed for migraine for up to 1 dose may repeat in 2 hours if necessary  Qty: 10 tablet, Refills: 1    Associated Diagnoses: Occipital headache           No discharge procedures on file.  ED SEPSIS DOCUMENTATION   Time reflects when diagnosis was documented in both MDM as applicable and the Disposition within this note       Time User Action Codes Description Comment    11/8/2024 10:42 PM Kaia Sunshine Add [R65.10] SIRS (systemic inflammatory response syndrome)  ([facility])     11/8/2024 10:42 PM Kaia Sunshine Add [R50.9] Fever of unknown origin (FUO)                  Kaia Sunshine PA-C  11/09/24 0557

## 2024-11-09 NOTE — ASSESSMENT & PLAN NOTE
Patient admitted with fevers for couple of days.  Persistent not responsive to Tylenol or ibuprofen at home.  No obvious source here.  Chest normal urine looked good no abdominal pain.  He did have a rapid strep that was positive although she has no symptoms and her throat exam is unremarkable.  Blood cultures been sent off the sent off a tick panel await blood cultures urine look good blood cultures were sent.  On then continue to treat her strep throat double down on antipyretics and follow her over the next 24 hours

## 2024-11-09 NOTE — ASSESSMENT & PLAN NOTE
Sepsis, POA, with fever, leukocytosis and tachycardia due to Strep pharyngitis. CXR negative for infiltrate  Lactic acid normal    Blood cultures pending so far negative  IV Unasyn  IVF  Monitor clinically and CBC

## 2024-11-09 NOTE — ASSESSMENT & PLAN NOTE
As noted above patient presented to the urgent care center with a fever and was diagnosed with strep pharyngitis and started on amoxicillin.  Had fevers despite the amoxicillin so came to the emergency room.  Will continue her amoxicillin here was given 1 dose of Rocephin in the emergency room

## 2024-11-09 NOTE — UTILIZATION REVIEW
Initial Clinical Review    Admission: Date/Time/Statement:   Admission Orders (From admission, onward)       Ordered        11/08/24 2329  Place in Observation  Once                          Orders Placed This Encounter   Procedures    Place in Observation     Standing Status:   Standing     Number of Occurrences:   1     Order Specific Question:   Level of Care     Answer:   Med Surg [16]     ED Arrival Information       Expected   -    Arrival   11/8/2024 20:43    Acuity   Urgent              Means of arrival   Walk-In    Escorted by   Spouse    Service   Hospitalist    Admission type   Emergency              Arrival complaint   Fever             Chief Complaint   Patient presents with    Fever     Patient co fever that she has not been able to break with alternating with Tylenol and ibuprofen x 2 days. Patient recently dx with strep and was given amoxicillin.        Initial Presentation: 39 y.o. female to ED via walk-in from home  Present to ED with fever. Endorses over the past couple days though she has had intermittent fevers. Went to the care center couple days ago had a rapid strep test that was positive was started on amoxicillin. Endorses fevers Not responsive to Tylenol and Motrin   PMHX: rapid strep positive   Admitted to OBS with DX: Fever  on exam: T 102.4; tachy; WBC 13.71; na 134; K 3.4  PLAN: cont iv abx; monitor labs; f/u blood/ urine cx; f/u tick panel; pain control (see below); monitor fever curve; trend procl      Anticipated Length of Stay/Certification Statement: Patient will be admitted on an observation basis with an anticipated length of stay of less than 2 midnights secondary to fever.       ED Treatment-Medication Administration from 11/08/2024 2043 to 11/09/2024 0004         Date/Time Order Dose Route Action     11/08/2024 2151 sodium chloride 0.9 % bolus 1,000 mL 1,000 mL Intravenous New Bag     11/08/2024 2151 acetaminophen (Ofirmev) injection 1,000 mg 1,000 mg Intravenous New Bag      11/08/2024 2244 ceftriaxone (ROCEPHIN) 2 g/50 mL in dextrose IVPB 2,000 mg Intravenous New Bag     11/08/2024 2244 potassium chloride (Klor-Con M20) CR tablet 20 mEq 20 mEq Oral Given            Scheduled Medications:  ampicillin-sulbactam, 3 g, Intravenous, Q6H  Loratadine, 10 mg, Oral, Daily      Continuous IV Infusions: None       PRN Meds:  acetaminophen, 1,000 mg, Intravenous, Q6H PRN (11/9 recd x1)    phenol, 1 spray, Mouth/Throat, Q2H PRN  (11/9 recd x1)        ED Triage Vitals   Temperature Pulse Respirations Blood Pressure SpO2 Pain Score   11/08/24 2048 11/08/24 2048 11/08/24 2048 11/08/24 2048 11/08/24 2048 11/09/24 0016   (!) 102.4 °F (39.1 °C) (!) 118 17 128/69 95 % No Pain     Weight (last 2 days)       Date/Time Weight    11/09/24 00:16:13 97.3 (214.51)    11/08/24 2048 97.3 (214.51)            Vital Signs (last 3 days)       Date/Time Temp Pulse Resp BP MAP (mmHg) SpO2 O2 Device Patient Position - Orthostatic VS Pain    11/09/24 07:43:26 99.3 °F (37.4 °C) 93 -- 125/81 96 97 % -- -- 7    11/09/24 00:16:13 98.3 °F (36.8 °C) 79 18 114/71 85 97 % -- -- No Pain    11/08/24 2330 -- 80 19 119/70 89 95 % None (Room air) Lying --    11/08/24 2300 -- 88 18 119/74 91 95 % None (Room air) Lying --    11/08/24 2230 99.4 °F (37.4 °C) 89 18 122/71 90 95 % None (Room air) Lying --    11/08/24 2145 -- 109 16 139/84 105 96 % None (Room air) Sitting --    11/08/24 2048 102.4 °F (39.1 °C) 118 17 128/69 -- 95 % None (Room air) Sitting --              Pertinent Labs/Diagnostic Test Results:   Radiology:  XR chest portable   Final Interpretation by Tim Wilde MD (11/09 0812)      No acute cardiopulmonary disease.            Workstation performed: [id number]             Results from last 7 days   Lab Units 11/09/24  0516 11/08/24  2149   WBC Thousand/uL 11.69* 13.71*   HEMOGLOBIN g/dL 12.7 13.3   HEMATOCRIT % 37.9 39.3   PLATELETS Thousands/uL 175 190   TOTAL NEUT ABS Thousands/µL 8.18* 11.00*        Results from  last 7 days   Lab Units 11/09/24  0516 11/08/24  2149   SODIUM mmol/L 137 134*   POTASSIUM mmol/L 3.9 3.4*   CHLORIDE mmol/L 107 102   CO2 mmol/L 24 23   ANION GAP mmol/L 6 9   BUN mg/dL 7 8   CREATININE mg/dL 0.63 0.82   EGFR ml/min/1.73sq m 114 91   CALCIUM mg/dL 8.7 9.3     Results from last 7 days   Lab Units 11/08/24  2149   AST U/L 24   ALT U/L 24   ALK PHOS U/L 58   TOTAL PROTEIN g/dL 7.4   ALBUMIN g/dL 4.3   TOTAL BILIRUBIN mg/dL 0.62        Results from last 7 days   Lab Units 11/09/24  0516 11/08/24  2149   GLUCOSE RANDOM mg/dL 96 139        Results from last 7 days   Lab Units 11/09/24  0125 11/08/24  2149   HS TNI 0HR ng/L  --  <2   HS TNI 2HR ng/L <2  --         Results from last 7 days   Lab Units 11/08/24  2149   PROTIME seconds 15.6*   INR  1.17   PTT seconds 28     Results from last 7 days   Lab Units 11/09/24  0125   TSH 3RD GENERATON uIU/mL 1.884     Results from last 7 days   Lab Units 11/08/24  2149   PROCALCITONIN ng/ml 0.15     Results from last 7 days   Lab Units 11/08/24  2150   LACTIC ACID mmol/L 0.7        Results from last 7 days   Lab Units 11/08/24  2149   LIPASE u/L 22        Results from last 7 days   Lab Units 11/08/24  2137   CLARITY UA  Turbid   COLOR UA  Yellow   SPEC GRAV UA  1.027   PH UA  5.5   GLUCOSE UA mg/dl Negative   KETONES UA mg/dl 60 (2+)*   BLOOD UA  Small*   PROTEIN UA mg/dl Trace*   NITRITE UA  Negative   BILIRUBIN UA  Negative   UROBILINOGEN UA (BE) mg/dl 3.0*   LEUKOCYTES UA  Trace*   WBC UA /hpf 4-10*   RBC UA /hpf 2-4*   BACTERIA UA /hpf Occasional   EPITHELIAL CELLS WET PREP /hpf Moderate*   MUCUS THREADS  Occasional*          Past Medical History:   Diagnosis Date    Allergic     Varicella           Admitting Diagnosis: Fever of unknown origin (FUO) [R50.9]  Fever [R50.9]  SIRS (systemic inflammatory response syndrome) (HCC) [R65.10]  Age/Sex: 39 y.o. female    Network Utilization Review Department  ATTENTION: Please call with any questions or concerns to  [phone] and carefully listen to the prompts so that you are directed to the right person. All voicemails are confidential.   For Discharge needs, contact Care Management DC Support Team at [phone] opt. [phone]  Send all requests for admission clinical reviews, approved or denied determinations and any other requests to dedicated fax number below belonging to the campus where the patient is receiving treatment. List of dedicated fax numbers for the Facilities:  FACILITY NAME UR FAX NUMBER   ADMISSION DENIALS (Administrative/Medical Necessity) [phone]   DISCHARGE SUPPORT TEAM (NETWORK) [phone]   PARENT CHILD HEALTH (Maternity/NICU/Pediatrics) [phone]   [facility] [phone]   [facility] [phone]   [facility] [phone]   [facility] [phone]   Count includes the [facility] [phone]   [facility] [phone]   [facility] [phone]   [facility] [phone]   [facility] [phone]   [facility] [phone]   [facility] [phone]   [facility] [phone]

## 2024-11-09 NOTE — ASSESSMENT & PLAN NOTE
As noted above patient presented to the urgent care center with a fever and was diagnosed with strep pharyngitis and started on amoxicillin no improvement after 2 doses only OP - fatigue, fever, sore throat, lack of appetite  Has developed L sided tonsillar pustule since    Received 1 dose of ceftriaxone and amoxicillin for now, start Unasyn  Monitor clinically, temperature curve

## 2024-11-09 NOTE — PROGRESS NOTES
Progress Note - Hospitalist   Name: Holli Driver 39 y.o. female I MRN: [id number]  Unit/Bed#: -01 I Date of Admission: 11/8/2024   Date of Service: 11/9/2024 I Hospital Day: 0    Assessment & Plan  Strep pharyngitis  As noted above patient presented to the urgent care center with a fever and was diagnosed with strep pharyngitis and started on amoxicillin no improvement after 2 doses only OP - fatigue, fever, sore throat, lack of appetite  Has developed L sided tonsillar pustule since    Received 1 dose of ceftriaxone and amoxicillin for now, start Unasyn  Monitor clinically, temperature curve  Sepsis (HCC)  Sepsis, POA, with fever, leukocytosis and tachycardia due to Strep pharyngitis. CXR negative for infiltrate  Lactic acid normal    Blood cultures pending so far negative  IV Unasyn  IVF  Monitor clinically and CBC  Obesity  BMI of 34 noted  Weight loss would be beneficial after resolution of acute illness  Electrolyte abnormality  Both hypokalemia and hyponatremia noted on admission, improving  Monitor    VTE Pharmacologic Prophylaxis: VTE Score: 2 Low Risk (Score 0-2) - Encourage Ambulation.    Mobility:   Basic Mobility Inpatient Raw Score: 24  JH-HLM Goal: 8: Walk 250 feet or more  JH-HLM Achieved: 8: Walk 250 feet ot more  JH-HLM Goal achieved. Continue to encourage appropriate mobility.    Patient Centered Rounds: I performed bedside rounds with nursing staff today.   Discussions with Specialists or Other Care Team Provider: Discussed with care management team    Education and Discussions with Family / Patient:  Relative at the bedside.     Current Length of Stay: 0 day(s)  Current Patient Status: Observation   Certification Statement: The patient will continue to require additional inpatient hospital stay due to need for IV antibiotics  Discharge Plan: Anticipate discharge in 24-48 hrs to home.    Code Status: Level 1 - Full Code    Subjective     Patient valuated this morning.  Still  had fever yesterday.  Still feels terrible, not eating much, poor p.o. intake, severe pain in the throat in the left side    Objective :  Temp:  [98.3 °F (36.8 °C)-102.4 °F (39.1 °C)] 99.3 °F (37.4 °C)  HR:  [] 93  BP: (114-139)/(69-84) 125/81  Resp:  [16-19] 18  SpO2:  [95 %-97 %] 97 %  O2 Device: None (Room air)    Body mass index is 34.62 kg/m².     Input and Output Summary (last 24 hours):     Intake/Output Summary (Last 24 hours) at 11/9/2024 1107  Last data filed at 11/9/2024 0743  Gross per 24 hour   Intake 120 ml   Output --   Net 120 ml       Physical Exam  Vitals and nursing note reviewed.   Constitutional:       Appearance: Normal appearance. She is normal weight. She is toxic-appearing.   HENT:      Head: Normocephalic and atraumatic.      Right Ear: External ear normal.      Left Ear: External ear normal.      Nose: Nose normal. No congestion.      Mouth/Throat:      Mouth: Mucous membranes are moist.      Pharynx: Oropharyngeal exudate and posterior oropharyngeal erythema present.      Comments: Erythema and exudate in posterior oropharynx with swelling of L tonsil and at least 2 areas of white exudate  Eyes:      General: No scleral icterus.        Right eye: No discharge.         Left eye: No discharge.      Extraocular Movements: Extraocular movements intact.      Conjunctiva/sclera: Conjunctivae normal.      Pupils: Pupils are equal, round, and reactive to light.   Cardiovascular:      Rate and Rhythm: Normal rate and regular rhythm.      Pulses: Normal pulses.      Heart sounds: Normal heart sounds. No murmur heard.     No friction rub. No gallop.   Pulmonary:      Effort: Pulmonary effort is normal. No respiratory distress.      Breath sounds: Normal breath sounds. No stridor. No wheezing, rhonchi or rales.   Chest:      Chest wall: No tenderness.   Abdominal:      General: Abdomen is flat. Bowel sounds are normal. There is no distension.      Palpations: Abdomen is soft. There is no mass.       Tenderness: There is no abdominal tenderness. There is no guarding or rebound.   Musculoskeletal:         General: No swelling, tenderness, deformity or signs of injury. Normal range of motion.      Cervical back: Normal range of motion and neck supple. No rigidity. No muscular tenderness.   Skin:     General: Skin is warm and dry.      Capillary Refill: Capillary refill takes less than 2 seconds.      Coloration: Skin is not jaundiced or pale.      Findings: No bruising, erythema, lesion or rash.   Neurological:      General: No focal deficit present.      Mental Status: She is alert and oriented to person, place, and time. Mental status is at baseline.      Cranial Nerves: No cranial nerve deficit.      Sensory: No sensory deficit.      Motor: No weakness.      Coordination: Coordination normal.   Psychiatric:         Mood and Affect: Mood normal.         Behavior: Behavior normal.         Thought Content: Thought content normal.         Judgment: Judgment normal.           Lines/Drains:              Lab Results: I have reviewed the following results:   Results from last 7 days   Lab Units 11/09/24  0516   WBC Thousand/uL 11.69*   HEMOGLOBIN g/dL 12.7   HEMATOCRIT % 37.9   PLATELETS Thousands/uL 175   SEGS PCT % 69   LYMPHO PCT % 19   MONO PCT % 10   EOS PCT % 1     Results from last 7 days   Lab Units 11/09/24  0516 11/08/24  2149   SODIUM mmol/L 137 134*   POTASSIUM mmol/L 3.9 3.4*   CHLORIDE mmol/L 107 102   CO2 mmol/L 24 23   BUN mg/dL 7 8   CREATININE mg/dL 0.63 0.82   ANION GAP mmol/L 6 9   CALCIUM mg/dL 8.7 9.3   ALBUMIN g/dL  --  4.3   TOTAL BILIRUBIN mg/dL  --  0.62   ALK PHOS U/L  --  58   ALT U/L  --  24   AST U/L  --  24   GLUCOSE RANDOM mg/dL 96 139     Results from last 7 days   Lab Units 11/08/24  2149   INR  1.17             Results from last 7 days   Lab Units 11/08/24  2150 11/08/24  2149   LACTIC ACID mmol/L 0.7  --    PROCALCITONIN ng/ml  --  0.15       Recent Cultures (last 7 days):          Imaging Results Review: No pertinent imaging studies reviewed.  Other Study Results Review: No additional pertinent studies reviewed.    Last 24 Hours Medication List:     Current Facility-Administered Medications:     acetaminophen (Ofirmev) injection 1,000 mg, Q6H PRN, Last Rate: 1,000 mg (11/09/24 0917)    ampicillin-sulbactam (UNASYN) 3 g in sodium chloride 0.9 % 100 mL IVPB, Q6H    Loratadine (CLARITIN) oral soln 10 mg, Daily    phenol (CHLORASEPTIC) 1.4 % mucosal liquid 1 spray, Q2H PRN    Administrative Statements   Today, Patient Was Seen By: Josh Vargas MD      **Please Note: This note may have been constructed using a voice recognition system.**

## 2024-11-10 VITALS
TEMPERATURE: 99 F | DIASTOLIC BLOOD PRESSURE: 81 MMHG | WEIGHT: 214.51 LBS | SYSTOLIC BLOOD PRESSURE: 126 MMHG | HEART RATE: 94 BPM | BODY MASS INDEX: 34.47 KG/M2 | OXYGEN SATURATION: 96 % | RESPIRATION RATE: 18 BRPM | HEIGHT: 66 IN

## 2024-11-10 PROBLEM — E87.8 ELECTROLYTE ABNORMALITY: Status: RESOLVED | Noted: 2024-11-09 | Resolved: 2024-11-10

## 2024-11-10 LAB
ALBUMIN SERPL BCG-MCNC: 3.7 G/DL (ref 3.5–5)
ALP SERPL-CCNC: 66 U/L (ref 34–104)
ALT SERPL W P-5'-P-CCNC: 53 U/L (ref 7–52)
ANION GAP SERPL CALCULATED.3IONS-SCNC: 5 MMOL/L (ref 4–13)
AST SERPL W P-5'-P-CCNC: 38 U/L (ref 13–39)
BASOPHILS # BLD AUTO: 0.07 THOUSANDS/ÂΜL (ref 0–0.1)
BASOPHILS NFR BLD AUTO: 1 % (ref 0–1)
BILIRUB SERPL-MCNC: 0.35 MG/DL (ref 0.2–1)
BUN SERPL-MCNC: 5 MG/DL (ref 5–25)
CALCIUM SERPL-MCNC: 8.6 MG/DL (ref 8.4–10.2)
CHLORIDE SERPL-SCNC: 109 MMOL/L (ref 96–108)
CO2 SERPL-SCNC: 25 MMOL/L (ref 21–32)
CREAT SERPL-MCNC: 0.54 MG/DL (ref 0.6–1.3)
EOSINOPHIL # BLD AUTO: 0.21 THOUSAND/ÂΜL (ref 0–0.61)
EOSINOPHIL NFR BLD AUTO: 2 % (ref 0–6)
ERYTHROCYTE [DISTWIDTH] IN BLOOD BY AUTOMATED COUNT: 12.1 % (ref 11.6–15.1)
GFR SERPL CREATININE-BSD FRML MDRD: 120 ML/MIN/1.73SQ M
GLUCOSE P FAST SERPL-MCNC: 98 MG/DL (ref 65–99)
GLUCOSE SERPL-MCNC: 98 MG/DL (ref 65–140)
HCT VFR BLD AUTO: 37.4 % (ref 34.8–46.1)
HGB BLD-MCNC: 12.4 G/DL (ref 11.5–15.4)
IMM GRANULOCYTES # BLD AUTO: 0.02 THOUSAND/UL (ref 0–0.2)
IMM GRANULOCYTES NFR BLD AUTO: 0 % (ref 0–2)
LYMPHOCYTES # BLD AUTO: 2.36 THOUSANDS/ÂΜL (ref 0.6–4.47)
LYMPHOCYTES NFR BLD AUTO: 26 % (ref 14–44)
MAGNESIUM SERPL-MCNC: 2.1 MG/DL (ref 1.9–2.7)
MCH RBC QN AUTO: 30.2 PG (ref 26.8–34.3)
MCHC RBC AUTO-ENTMCNC: 33.2 G/DL (ref 31.4–37.4)
MCV RBC AUTO: 91 FL (ref 82–98)
MONOCYTES # BLD AUTO: 0.99 THOUSAND/ÂΜL (ref 0.17–1.22)
MONOCYTES NFR BLD AUTO: 11 % (ref 4–12)
NEUTROPHILS # BLD AUTO: 5.41 THOUSANDS/ÂΜL (ref 1.85–7.62)
NEUTS SEG NFR BLD AUTO: 60 % (ref 43–75)
NRBC BLD AUTO-RTO: 0 /100 WBCS
PLATELET # BLD AUTO: 186 THOUSANDS/UL (ref 149–390)
PMV BLD AUTO: 9.5 FL (ref 8.9–12.7)
POTASSIUM SERPL-SCNC: 3.9 MMOL/L (ref 3.5–5.3)
PROT SERPL-MCNC: 6.7 G/DL (ref 6.4–8.4)
RBC # BLD AUTO: 4.1 MILLION/UL (ref 3.81–5.12)
SODIUM SERPL-SCNC: 139 MMOL/L (ref 135–147)
WBC # BLD AUTO: 9.06 THOUSAND/UL (ref 4.31–10.16)

## 2024-11-10 PROCEDURE — 85025 COMPLETE CBC W/AUTO DIFF WBC: CPT | Performed by: INTERNAL MEDICINE

## 2024-11-10 PROCEDURE — 93005 ELECTROCARDIOGRAM TRACING: CPT

## 2024-11-10 PROCEDURE — 99233 SBSQ HOSP IP/OBS HIGH 50: CPT | Performed by: INTERNAL MEDICINE

## 2024-11-10 PROCEDURE — 83735 ASSAY OF MAGNESIUM: CPT | Performed by: INTERNAL MEDICINE

## 2024-11-10 PROCEDURE — 80053 COMPREHEN METABOLIC PANEL: CPT | Performed by: INTERNAL MEDICINE

## 2024-11-10 PROCEDURE — 99239 HOSP IP/OBS DSCHRG MGMT >30: CPT | Performed by: INTERNAL MEDICINE

## 2024-11-10 RX ORDER — PREDNISOLONE SODIUM PHOSPHATE 15 MG/5ML
SOLUTION ORAL
Qty: 52 ML | Refills: 0 | Status: SHIPPED | OUTPATIENT
Start: 2024-11-10 | End: 2024-11-18

## 2024-11-10 RX ORDER — AMOXICILLIN AND CLAVULANATE POTASSIUM 600; 42.9 MG/5ML; MG/5ML
850 POWDER, FOR SUSPENSION ORAL 2 TIMES DAILY
Qty: 99.4 ML | Refills: 0 | Status: SHIPPED | OUTPATIENT
Start: 2024-11-10 | End: 2024-11-18

## 2024-11-10 RX ORDER — DOCUSATE SODIUM 100 MG/1
100 CAPSULE, LIQUID FILLED ORAL 2 TIMES DAILY
Status: DISCONTINUED | OUTPATIENT
Start: 2024-11-10 | End: 2024-11-10

## 2024-11-10 RX ADMIN — AMPICILLIN SODIUM AND SULBACTAM SODIUM 3 G: 100; 50 INJECTION, POWDER, FOR SOLUTION INTRAVENOUS at 07:38

## 2024-11-10 RX ADMIN — LORATADINE 10 MG: 5 SOLUTION ORAL at 07:39

## 2024-11-10 RX ADMIN — DOCUSATE SODIUM 100 MG: 50 LIQUID ORAL at 13:35

## 2024-11-10 RX ADMIN — AMPICILLIN SODIUM AND SULBACTAM SODIUM 3 G: 100; 50 INJECTION, POWDER, FOR SOLUTION INTRAVENOUS at 13:35

## 2024-11-10 RX ADMIN — Medication 1 SPRAY: at 07:40

## 2024-11-10 RX ADMIN — AMPICILLIN SODIUM AND SULBACTAM SODIUM 3 G: 100; 50 INJECTION, POWDER, FOR SOLUTION INTRAVENOUS at 02:47

## 2024-11-10 NOTE — DISCHARGE INSTR - AVS FIRST PAGE
Stay well hydrated.    If you experience persistent fever or worsening throat pain or swelling despite three days of antibiotic therapy do not hesitate to seek for medical help.

## 2024-11-10 NOTE — ASSESSMENT & PLAN NOTE
As noted above patient presented to the urgent care center with a fever and was diagnosed with strep pharyngitis and started on amoxicillin no improvement after 2 doses only OP - fatigue, fever, sore throat, lack of appetite  Has developed L sided tonsillar pustule since    Patient feeling better after institution of Unasyn therapy, erythema and purulence decreasing in L tonsil, had some migraine yesterday but improving today    Patient no longer septic WBC better HR improved. Given clinical improvement low suspicion for abscess right now discussed with her and family. Discussed with patient, offered either continued stay vs an extra dose of IV antibiotics this afternoon with discharge and monitoring on PO antibiotics. Patient to receive a dose of Unasyn this afternoon and plan for potential DC subsequently on PO Augmentin (patient prefers liquid solution).

## 2024-11-10 NOTE — ASSESSMENT & PLAN NOTE
Sepsis, POA, with fever, leukocytosis and tachycardia due to Strep pharyngitis. CXR negative for infiltrate  Lactic acid normal    Blood cultures so far negative  Continue Unasyn for now, eventual transition to Augmentin at discharge

## 2024-11-10 NOTE — DISCHARGE SUMMARY
Discharge Summary - Hospitalist   Name: Holli Driver 39 y.o. female I MRN: [id number]  Unit/Bed#: -01 I Date of Admission: 11/8/2024   Date of Service: 11/10/2024 I Hospital Day: 0       Discharge Diagnosis:    Streptococcal pharyngitis  Sepsis given above  Hypokalemia and hyponatremia - resolved  BMI 34    Discharging Physician / Practitioner: Josh Vargas MD  PCP: Amrita Solomon DO  Admission Date:   Admission Orders (From admission, onward)       Ordered        11/08/24 2329  Place in Observation  Once                          Discharge Date: 11/10/24        Significant Findings / Test Results:   Procedure Component Value Units Date/Time   XR chest portable [[id number]] Collected: 11/09/24 0812   Order Status: Completed Updated: 11/09/24 0814   Narrative:     XR CHEST PORTABLE    INDICATION: cardiac work-up.    COMPARISON: None    FINDINGS:    Clear lungs. No pneumothorax or pleural effusion.    Normal cardiomediastinal silhouette.    Bones are unremarkable for age.    Normal upper abdomen.   Impression:       No acute cardiopulmonary disease.            Outpatient follow up Requested:  PCP    Complications:  None    Reason for Admission: Sepsis, strep pharyngitis    HPI:  Holli Driver is a 39 y.o. female with a PMH of rapid strep positive who presents with for.  Patient reports being generally healthy over the past couple days though she has had intermittent fevers.  Went to the care center couple days ago had a rapid strep test that was positive was started on amoxicillin.  Despite that fact has had intermittent fevers for the past 36 hours.  Not responsive to Tylenol and Motrin at.  She denies any localized symptoms such as sore throat, abdominal pain, dysuria, no ulcers.  She travels a bit and goes outdoors a lot but she denies any rashes.  In the ED she is given some volume, was given antipyretics which helped a ton blood cultures and urine cultures were  "sent.  Decision is made to admit her overnight keep an eye on her antipyretics for fever and likely restart her amoxicillin in the morning..     Hospital Course:     Patient was hospitalized.  She was treated with Unasyn.  The erythema and pain in the posterior pharynx are improving, patient's white count improving as well.  Patient had high-grade fever initially but this is trending down over time.   Clinically given improvement at this time no concern for abscess acutely.  Offered patient either continuation of IV therapy or transition to p.o. with close observation outpatient.  Patient would prefer outpatient treatment if possible.  Given stability sending her on 7-day course of Augmentin, she did request solution rather than tablets.  Also sending on a course of steroids.  Patient informed that if any worsening fever, difficulty swallowing, septic signs, she should come back to the ED.  Otherwise if improvement in fever, pain she should continue just antibiotic course.  Discussed with family.  Being discharged in stable condition    Condition at Discharge: good     Discharge Day Visit / Exam:     Subjective:    Doing well, throat pain improving  Denies any CP, nausea, vomiting  Vitals: Blood Pressure: 126/81 (11/10/24 0718)  Pulse: 94 (11/10/24 0718)  Temperature: 99 °F (37.2 °C) (11/10/24 0718)  Temp Source: Oral (11/09/24 2323)  Respirations: 18 (11/09/24 2323)  Height: 5' 6\" (167.6 cm) (11/09/24 0016)  Weight - Scale: 97.3 kg (214 lb 8.1 oz) (11/09/24 0016)  SpO2: 96 % (11/10/24 0718)    Exam:   Physical Exam  Vitals and nursing note reviewed.   Constitutional:       General: She is not in acute distress.     Appearance: Normal appearance. She is normal weight. She is not ill-appearing, toxic-appearing or diaphoretic.   HENT:      Head: Normocephalic and atraumatic.      Right Ear: External ear normal.      Left Ear: External ear normal.      Nose: Nose normal. No congestion.      Mouth/Throat:      Mouth: " Mucous membranes are moist.      Pharynx: Oropharyngeal exudate and posterior oropharyngeal erythema present.      Comments: Erythema still noted in posterior oropharynx particularly L side also noted exudate on L but smaller compared to yesterday  Eyes:      General: No scleral icterus.        Right eye: No discharge.         Left eye: No discharge.      Extraocular Movements: Extraocular movements intact.      Conjunctiva/sclera: Conjunctivae normal.      Pupils: Pupils are equal, round, and reactive to light.   Cardiovascular:      Rate and Rhythm: Normal rate and regular rhythm.      Pulses: Normal pulses.      Heart sounds: Normal heart sounds. No murmur heard.     No friction rub. No gallop.   Pulmonary:      Effort: Pulmonary effort is normal. No respiratory distress.      Breath sounds: Normal breath sounds. No stridor. No wheezing, rhonchi or rales.   Chest:      Chest wall: No tenderness.   Abdominal:      General: Abdomen is flat. Bowel sounds are normal. There is no distension.      Palpations: Abdomen is soft. There is no mass.      Tenderness: There is no abdominal tenderness. There is no guarding or rebound.   Musculoskeletal:         General: No swelling, tenderness, deformity or signs of injury. Normal range of motion.      Cervical back: Normal range of motion and neck supple. No rigidity. No muscular tenderness.   Skin:     General: Skin is warm and dry.      Capillary Refill: Capillary refill takes less than 2 seconds.      Coloration: Skin is not jaundiced or pale.      Findings: No bruising, erythema, lesion or rash.   Neurological:      General: No focal deficit present.      Mental Status: She is alert and oriented to person, place, and time. Mental status is at baseline.      Cranial Nerves: No cranial nerve deficit.      Sensory: No sensory deficit.      Motor: No weakness.      Coordination: Coordination normal.   Psychiatric:         Mood and Affect: Mood normal.         Behavior:  Behavior normal.         Thought Content: Thought content normal.         Judgment: Judgment normal.           Discussion with Family: Discussed with family over the phone    Discharge instructions/Information to patient and family:   See after visit summary for information provided to patient and family.      Provisions for Follow-Up Care:  See after visit summary for information related to follow-up care and any pertinent home health orders.      Disposition:     Home    For Discharges to [facility] SNF:   Not Applicable to this Patient - Not Applicable to this Patient    Planned Readmission: No     Discharge Statement:  I spent 84 minutes discharging the patient. This time was spent on the day of discharge. I had direct contact with the patient on the day of discharge. Greater than 50% of the total time was spent examining patient, answering all patient questions, arranging and discussing plan of care with patient as well as directly providing post-discharge instructions.  Additional time then spent on discharge activities.    Discharge Medications:  See after visit summary for reconciled discharge medications provided to patient and family.      ** Please Note: This note has been constructed using a voice recognition system **

## 2024-11-10 NOTE — PLAN OF CARE
Problem: DISCHARGE PLANNING  Goal: Discharge to home or other facility with appropriate resources  Description: INTERVENTIONS:  - Identify barriers to discharge w/patient and caregiver  - Arrange for needed discharge resources and transportation as appropriate  - Identify discharge learning needs (meds, wound care, etc.)  - Arrange for interpretive services to assist at discharge as needed  - Refer to Case Management Department for coordinating discharge planning if the patient needs post-hospital services based on physician/advanced practitioner order or complex needs related to functional status, cognitive ability, or social support system  Outcome: Progressing     Problem: Knowledge Deficit  Goal: Patient/family/caregiver demonstrates understanding of disease process, treatment plan, medications, and discharge instructions  Description: Complete learning assessment and assess knowledge base.  Interventions:  - Provide teaching at level of understanding  - Provide teaching via preferred learning methods  Outcome: Progressing     Problem: NEUROSENSORY - ADULT  Goal: Achieves stable or improved neurological status  Description: INTERVENTIONS  - Monitor and report changes in neurological status  - Monitor vital signs such as temperature, blood pressure, glucose, and any other labs ordered   - Initiate measures to prevent increased intracranial pressure  - Monitor for seizure activity and implement precautions if appropriate      Outcome: Progressing     Problem: METABOLIC, FLUID AND ELECTROLYTES - ADULT  Goal: Electrolytes maintained within normal limits  Description: INTERVENTIONS:  - Monitor labs and assess patient for signs and symptoms of electrolyte imbalances  - Administer electrolyte replacement as ordered  - Monitor response to electrolyte replacements, including repeat lab results as appropriate  - Instruct patient on fluid and nutrition as appropriate  Outcome: Progressing  Goal: Fluid balance  maintained  Description: INTERVENTIONS:  - Monitor labs   - Monitor I/O and WT  - Instruct patient on fluid and nutrition as appropriate  - Assess for signs & symptoms of volume excess or deficit  Outcome: Progressing     Problem: PAIN - ADULT  Goal: Verbalizes/displays adequate comfort level or baseline comfort level  Description: Interventions:  - Encourage patient to monitor pain and request assistance  - Assess pain using appropriate pain scale  - Administer analgesics based on type and severity of pain and evaluate response  - Implement non-pharmacological measures as appropriate and evaluate response  - Consider cultural and social influences on pain and pain management  - Notify physician/advanced practitioner if interventions unsuccessful or patient reports new pain  Outcome: Progressing

## 2024-11-10 NOTE — PLAN OF CARE
Problem: INFECTION - ADULT  Goal: Absence or prevention of progression during hospitalization  Description: INTERVENTIONS:  - Assess and monitor for signs and symptoms of infection  - Monitor lab/diagnostic results  - Monitor all insertion sites, i.e. indwelling lines, tubes, and drains  - Monitor endotracheal if appropriate and nasal secretions for changes in amount and color  - Michie appropriate cooling/warming therapies per order  - Administer medications as ordered  - Instruct and encourage patient and family to use good hand hygiene technique  - Identify and instruct in appropriate isolation precautions for identified infection/condition  Outcome: Completed     Problem: PAIN - ADULT  Goal: Verbalizes/displays adequate comfort level or baseline comfort level  Description: Interventions:  - Encourage patient to monitor pain and request assistance  - Assess pain using appropriate pain scale  - Administer analgesics based on type and severity of pain and evaluate response  - Implement non-pharmacological measures as appropriate and evaluate response  - Consider cultural and social influences on pain and pain management  - Notify physician/advanced practitioner if interventions unsuccessful or patient reports new pain  Outcome: Completed - Pt reports improvement of migraine headache and able to swallow food.   Goal: Verbalizes/displays adequate comfort level or baseline comfort level  Description: Interventions:  - Encourage patient to monitor pain and request assistance  - Assess pain using appropriate pain scale  - Administer analgesics based on type and severity of pain and evaluate response  - Implement non-pharmacological measures as appropriate and evaluate response  - Consider cultural and social influences on pain and pain management  - Notify physician/advanced practitioner if interventions unsuccessful or patient reports new pain  Outcome: Adequate for Discharge     Problem: Knowledge Deficit  Goal:  Patient/family/caregiver demonstrates understanding of disease process, treatment plan, medications, and discharge instructions  Description: Complete learning assessment and assess knowledge base.  Interventions:  - Provide teaching at level of understanding  - Provide teaching via preferred learning methods  Outcome: Completed     Problem: DISCHARGE PLANNING  Goal: Discharge to home or other facility with appropriate resources  Description: INTERVENTIONS:  - Identify barriers to discharge w/patient and caregiver  - Arrange for needed discharge resources and transportation as appropriate  - Identify discharge learning needs (meds, wound care, etc.)  - Arrange for interpretive services to assist at discharge as needed  - Refer to Case Management Department for coordinating discharge planning if the patient needs post-hospital services based on physician/advanced practitioner order or complex needs related to functional status, cognitive ability, or social support system  Outcome: Completed

## 2024-11-10 NOTE — PROGRESS NOTES
Progress Note - Hospitalist   Name: Holli Driver 39 y.o. female I MRN: [id number]  Unit/Bed#: -01 I Date of Admission: 11/8/2024   Date of Service: 11/10/2024 I Hospital Day: 0    Assessment & Plan  Strep pharyngitis  As noted above patient presented to the urgent care center with a fever and was diagnosed with strep pharyngitis and started on amoxicillin no improvement after 2 doses only OP - fatigue, fever, sore throat, lack of appetite  Has developed L sided tonsillar pustule since    Patient feeling better after institution of Unasyn therapy, erythema and purulence decreasing in L tonsil, had some migraine yesterday but improving today    Patient no longer septic WBC better HR improved. Given clinical improvement low suspicion for abscess right now discussed with her and family. Discussed with patient, offered either continued stay vs an extra dose of IV antibiotics this afternoon with discharge and monitoring on PO antibiotics. Patient to receive a dose of Unasyn this afternoon and plan for potential DC subsequently on PO Augmentin (patient prefers liquid solution).  Sepsis (HCC)  Sepsis, POA, with fever, leukocytosis and tachycardia due to Strep pharyngitis. CXR negative for infiltrate  Lactic acid normal    Blood cultures so far negative  Continue Unasyn for now, eventual transition to Augmentin at discharge  Obesity  BMI of 34 noted  Weight loss would be beneficial after resolution of acute illness  Electrolyte abnormality (Resolved: 11/10/2024)  Both hypokalemia and hyponatremia noted on admission, improving  Monitor    VTE Pharmacologic Prophylaxis: VTE Score: 2 Low Risk (Score 0-2) - Encourage Ambulation.    Mobility:   Basic Mobility Inpatient Raw Score: 24  JH-HLM Goal: 8: Walk 250 feet or more  JH-HLM Achieved: 8: Walk 250 feet ot more  JH-HLM Goal achieved. Continue to encourage appropriate mobility.    Patient Centered Rounds: I performed bedside rounds with nursing staff  today.   Discussions with Specialists or Other Care Team Provider: Discussed with care management team    Education and Discussions with Family / Patient: Updated  (family) via phone.    Current Length of Stay: 0 day(s)  Current Patient Status: Observation   Certification Statement: The patient will continue to require additional inpatient hospital stay due to IV atbx  Discharge Plan:  Potential DC today this afternoon pending IV atbx and clinical course    Code Status: Level 1 - Full Code    Subjective   Patient evaluated this morning.  Mentions that pain in throat is improving although still present.  Did have a fever last night but not this morning.  Denies any nausea or vomiting.  Did have a headache yesterday, no focal neurological exam, no headache today however has received a migraine cocktail with magnesium.    Objective :  Temp:  [99 °F (37.2 °C)-101.5 °F (38.6 °C)] 99 °F (37.2 °C)  HR:  [] 94  BP: (120-127)/(76-83) 126/81  Resp:  [18] 18  SpO2:  [95 %-98 %] 96 %  O2 Device: None (Room air)    Body mass index is 34.62 kg/m².     Input and Output Summary (last 24 hours):     Intake/Output Summary (Last 24 hours) at 11/10/2024 1349  Last data filed at 11/10/2024 0401  Gross per 24 hour   Intake --   Output 300 ml   Net -300 ml       Physical Exam  Vitals and nursing note reviewed.   Constitutional:       General: She is not in acute distress.     Appearance: Normal appearance. She is normal weight. She is not ill-appearing, toxic-appearing or diaphoretic.   HENT:      Head: Normocephalic and atraumatic.      Right Ear: External ear normal.      Left Ear: External ear normal.      Nose: Nose normal. No congestion.      Mouth/Throat:      Mouth: Mucous membranes are moist.      Pharynx: Oropharyngeal exudate and posterior oropharyngeal erythema present.      Comments: Erythema still noted in posterior oropharynx particularly L side also noted exudate on L but smaller compared to  yesterday  Eyes:      General: No scleral icterus.        Right eye: No discharge.         Left eye: No discharge.      Extraocular Movements: Extraocular movements intact.      Conjunctiva/sclera: Conjunctivae normal.      Pupils: Pupils are equal, round, and reactive to light.   Cardiovascular:      Rate and Rhythm: Normal rate and regular rhythm.      Pulses: Normal pulses.      Heart sounds: Normal heart sounds. No murmur heard.     No friction rub. No gallop.   Pulmonary:      Effort: Pulmonary effort is normal. No respiratory distress.      Breath sounds: Normal breath sounds. No stridor. No wheezing, rhonchi or rales.   Chest:      Chest wall: No tenderness.   Abdominal:      General: Abdomen is flat. Bowel sounds are normal. There is no distension.      Palpations: Abdomen is soft. There is no mass.      Tenderness: There is no abdominal tenderness. There is no guarding or rebound.   Musculoskeletal:         General: No swelling, tenderness, deformity or signs of injury. Normal range of motion.      Cervical back: Normal range of motion and neck supple. No rigidity. No muscular tenderness.   Skin:     General: Skin is warm and dry.      Capillary Refill: Capillary refill takes less than 2 seconds.      Coloration: Skin is not jaundiced or pale.      Findings: No bruising, erythema, lesion or rash.   Neurological:      General: No focal deficit present.      Mental Status: She is alert and oriented to person, place, and time. Mental status is at baseline.      Cranial Nerves: No cranial nerve deficit.      Sensory: No sensory deficit.      Motor: No weakness.      Coordination: Coordination normal.   Psychiatric:         Mood and Affect: Mood normal.         Behavior: Behavior normal.         Thought Content: Thought content normal.         Judgment: Judgment normal.           Lines/Drains:              Lab Results: I have reviewed the following results:   Results from last 7 days   Lab Units 11/10/24  2685    WBC Thousand/uL 9.06   HEMOGLOBIN g/dL 12.4   HEMATOCRIT % 37.4   PLATELETS Thousands/uL 186   SEGS PCT % 60   LYMPHO PCT % 26   MONO PCT % 11   EOS PCT % 2     Results from last 7 days   Lab Units 11/10/24  0530   SODIUM mmol/L 139   POTASSIUM mmol/L 3.9   CHLORIDE mmol/L 109*   CO2 mmol/L 25   BUN mg/dL 5   CREATININE mg/dL 0.54*   ANION GAP mmol/L 5   CALCIUM mg/dL 8.6   ALBUMIN g/dL 3.7   TOTAL BILIRUBIN mg/dL 0.35   ALK PHOS U/L 66   ALT U/L 53*   AST U/L 38   GLUCOSE RANDOM mg/dL 98     Results from last 7 days   Lab Units 11/08/24  2149   INR  1.17             Results from last 7 days   Lab Units 11/08/24 2150 11/08/24 2149   LACTIC ACID mmol/L 0.7  --    PROCALCITONIN ng/ml  --  0.15       Recent Cultures (last 7 days):   Results from last 7 days   Lab Units 11/08/24 2150   BLOOD CULTURE  Received in Microbiology Lab. Culture in Progress.  Received in Microbiology Lab. Culture in Progress.       Imaging Results Review: No pertinent imaging studies reviewed.  Other Study Results Review: No additional pertinent studies reviewed.    Last 24 Hours Medication List:     Current Facility-Administered Medications:     acetaminophen (Ofirmev) injection 1,000 mg, Q6H PRN, Last Rate: 1,000 mg (11/09/24 2042)    ampicillin-sulbactam (UNASYN) 3 g in sodium chloride 0.9 % 100 mL IVPB, Q6H, Last Rate: 3 g (11/10/24 1335)    docusate (COLACE) oral liquid 100 mg, BID    Loratadine (CLARITIN) oral soln 10 mg, Daily    phenol (CHLORASEPTIC) 1.4 % mucosal liquid 1 spray, Q2H PRN    Administrative Statements   Today, Patient Was Seen By: Josh Vargas MD      **Please Note: This note may have been constructed using a voice recognition system.**

## 2024-11-11 ENCOUNTER — TRANSITIONAL CARE MANAGEMENT (OUTPATIENT)
Dept: FAMILY MEDICINE CLINIC | Facility: CLINIC | Age: 39
End: 2024-11-11

## 2024-11-11 LAB
ATRIAL RATE: 103 BPM
ATRIAL RATE: 80 BPM
ATRIAL RATE: 91 BPM
BLD SMEAR FINDING POSITIVE INTERP: NO
P AXIS: 39 DEGREES
P AXIS: 50 DEGREES
P AXIS: 57 DEGREES
PARASITE BLD: NO
PARASITE BLD: NO
PARASITE INFECTED RBC BLD-NFR: 0 %
PR INTERVAL: 146 MS
PR INTERVAL: 150 MS
PR INTERVAL: 98 MS
QRS AXIS: 10 DEGREES
QRS AXIS: 26 DEGREES
QRS AXIS: 50 DEGREES
QRSD INTERVAL: 100 MS
QRSD INTERVAL: 82 MS
QRSD INTERVAL: 86 MS
QT INTERVAL: 356 MS
QT INTERVAL: 360 MS
QT INTERVAL: 392 MS
QTC INTERVAL: 443 MS
QTC INTERVAL: 453 MS
QTC INTERVAL: 466 MS
T WAVE AXIS: 30 DEGREES
T WAVE AXIS: 40 DEGREES
T WAVE AXIS: 46 DEGREES
VENTRICULAR RATE: 103 BPM
VENTRICULAR RATE: 80 BPM
VENTRICULAR RATE: 91 BPM

## 2024-11-11 PROCEDURE — 85060 BLOOD SMEAR INTERPRETATION: CPT | Performed by: STUDENT IN AN ORGANIZED HEALTH CARE EDUCATION/TRAINING PROGRAM

## 2024-11-11 PROCEDURE — 93010 ELECTROCARDIOGRAM REPORT: CPT | Performed by: INTERNAL MEDICINE

## 2024-11-11 PROCEDURE — 87207 SMEAR SPECIAL STAIN: CPT | Performed by: STUDENT IN AN ORGANIZED HEALTH CARE EDUCATION/TRAINING PROGRAM

## 2024-11-13 LAB — A PHAGOCYTOPH DNA BLD QL NAA+PROBE: NEGATIVE

## 2024-11-14 LAB
BACTERIA BLD CULT: NORMAL
BACTERIA BLD CULT: NORMAL

## 2024-11-15 PROBLEM — J02.0 STREP PHARYNGITIS: Status: RESOLVED | Noted: 2024-11-08 | Resolved: 2024-11-15

## 2024-11-15 PROBLEM — A41.9 SEPSIS (HCC): Status: RESOLVED | Noted: 2024-11-09 | Resolved: 2024-11-15

## 2024-11-15 NOTE — PROGRESS NOTES
Transition of Care Visit  Name: Holli Driver      : 1985      MRN: [id number]  Encounter Provider: Amrita Solomon DO  Encounter Date: 2024   Encounter department: [facility]    Assessment & Plan  Strep pharyngitis  Symptoms much improved, with some lingered fatigue/poor appetite. Continue supportive care.        Sepsis without acute organ dysfunction, due to unspecified organism (HCC)  Resolved       Transition of care         Arthralgia, unspecified joint  Given persistence of symptoms and (+)FHx autoimmune processes, will update labs to evaluate for possible autoimmune/rheumatoid process, anemia, thyroid dysfunction, vitamin deficiency contributing. If benign, could consider referral to Rheumatology to discuss further. Encouraged to wait at least 2 weeks to have labs drawn to allow for time after recent illness.   Orders:    RF Screen w/ Reflex to Titer; Future    ANN Screen w/ Reflex to Titer/Pattern; Future    C-reactive protein; Future    Sedimentation rate, automated; Future    TSH, 3rd generation with Free T4 reflex; Future    Vitamin D 25 hydroxy; Future    Vitamin B12; Future    Iron Panel (Includes Ferritin, Iron Sat%, Iron, and TIBC); Future    Brain fog    Orders:    RF Screen w/ Reflex to Titer; Future    ANN Screen w/ Reflex to Titer/Pattern; Future    C-reactive protein; Future    Sedimentation rate, automated; Future    TSH, 3rd generation with Free T4 reflex; Future    Vitamin D 25 hydroxy; Future    Vitamin B12; Future    Iron Panel (Includes Ferritin, Iron Sat%, Iron, and TIBC); Future    Other fatigue    Orders:    RF Screen w/ Reflex to Titer; Future    ANN Screen w/ Reflex to Titer/Pattern; Future    C-reactive protein; Future    Sedimentation rate, automated; Future    TSH, 3rd generation with Free T4 reflex; Future    Vitamin D 25 hydroxy; Future    Vitamin B12; Future    Iron Panel (Includes Ferritin, Iron Sat%, Iron, and TIBC);  "Future    Screening, lipid  Due to update -- will order to complete with other labs   Orders:    Lipid panel; Future         History of Present Illness     Transitional Care Management Review:   Holli Driver is a 39 y.o. female here for TCM follow up.     During the TCM phone call patient stated:  TCM Call       Date and time call was made  11/11/2024 10:29 AM    Hospital care reviewed  Records reviewed    Patient was hospitialized at  [facility]    Date of Admission  11/08/24    Date of discharge  11/10/24    Diagnosis  Strep pharyngitis    Disposition  Home    Were the patients medications reviewed and updated  No    Current Symptoms  None          TCM Call       Post hospital issues  None    Should patient be enrolled in anticoag monitoring?  No    Scheduled for follow up?  Yes    I have advised the patient to call PCP with any new or worsening symptoms  LIA BABB          HPI    Pt presents for hospital follow up s/p admission to [facility] from 11/8-11/10 due to sepsis in setting of strep pharyngitis. Pt was strep (+) at Urgent Care, but continued to have intermittent fevers despite antibiotics. Given IV antibiotics and discharged on Augmentin for full 7 day course and prednisone taper.   EKG NSR; Trop (-) x2   CXR benign   Cr 0.82/GFR 91 --> 0.54/120  ALT 53 at d/c, otherwise LFTs benign   WBC 13 --> 9   TSH WNL   COVID/Flu (-), Procal (-), Blood Cx (-)x2; Lyme, Anaplasma, Blood parasite smear (-)    Today:   \"I feel fine\"  Still has poor appetite, but trying to make sure she is eating   Fatigued (though she did take last week off to rest)   Headache resolved 11/13 or 11/14 (of note, pt didn't feel well with migraine cocktail)   Still having a sporadic cough, but much improved     Pt notes that normally when sick, she will spike high fevers, but is able to manage with OTC anti-pyretics. However, this time she was not. She also noted her heart felt like it was pounding out of her chest " "and when she checked her smart watch it told her to seek medical attention. This is what drove her to go to the ED. Pt is concerned as all of the women in her family have been diagnosed with an autoimmune disease and she is unsure if this may be related. Did previously had benign labs in 2022, but her symptoms persist.     Review of Systems   Constitutional:  Positive for appetite change and fatigue. Negative for fever.   HENT:  Negative for congestion, ear pain, rhinorrhea, sore throat and trouble swallowing.    Eyes:  Positive for photophobia (just a little more sensitive to light).   Respiratory:  Positive for cough. Negative for shortness of breath.    Gastrointestinal:  Positive for nausea (while driving in the car). Negative for constipation and diarrhea.   Neurological:  Negative for headaches.     Objective   /74   Pulse 75   Temp 97.8 °F (36.6 °C)   Ht 5' 6\" (1.676 m)   Wt 96.6 kg (213 lb)   SpO2 98%   BMI 34.38 kg/m²     Physical Exam  Vitals and nursing note reviewed.   Constitutional:       General: She is not in acute distress.     Appearance: She is well-developed.   HENT:      Head: Normocephalic and atraumatic.      Right Ear: Tympanic membrane, ear canal and external ear normal.      Left Ear: Tympanic membrane, ear canal and external ear normal.      Nose: Nose normal. No rhinorrhea.      Mouth/Throat:      Mouth: Mucous membranes are moist.      Pharynx: No oropharyngeal exudate or posterior oropharyngeal erythema.   Eyes:      Conjunctiva/sclera: Conjunctivae normal.   Cardiovascular:      Rate and Rhythm: Normal rate and regular rhythm.   Pulmonary:      Effort: Pulmonary effort is normal. No respiratory distress.      Breath sounds: Normal breath sounds.   Abdominal:      General: Bowel sounds are normal. There is no distension.      Palpations: Abdomen is soft.      Tenderness: There is no abdominal tenderness.   Musculoskeletal:      Right lower leg: No edema.      Left lower leg: " No edema.   Lymphadenopathy:      Cervical: No cervical adenopathy.   Skin:     General: Skin is warm and dry.   Neurological:      Mental Status: She is alert.      Comments: Grossly intact   Psychiatric:         Mood and Affect: Mood normal.       Medications have been reviewed by provider in current encounter

## 2024-11-18 ENCOUNTER — OFFICE VISIT (OUTPATIENT)
Dept: FAMILY MEDICINE CLINIC | Facility: CLINIC | Age: 39
End: 2024-11-18
Payer: COMMERCIAL

## 2024-11-18 VITALS
DIASTOLIC BLOOD PRESSURE: 74 MMHG | TEMPERATURE: 97.8 F | WEIGHT: 213 LBS | HEIGHT: 66 IN | BODY MASS INDEX: 34.23 KG/M2 | OXYGEN SATURATION: 98 % | HEART RATE: 75 BPM | SYSTOLIC BLOOD PRESSURE: 128 MMHG

## 2024-11-18 DIAGNOSIS — A41.9 SEPSIS WITHOUT ACUTE ORGAN DYSFUNCTION, DUE TO UNSPECIFIED ORGANISM (HCC): ICD-10-CM

## 2024-11-18 DIAGNOSIS — M25.50 ARTHRALGIA, UNSPECIFIED JOINT: ICD-10-CM

## 2024-11-18 DIAGNOSIS — R53.83 OTHER FATIGUE: ICD-10-CM

## 2024-11-18 DIAGNOSIS — R41.89 BRAIN FOG: ICD-10-CM

## 2024-11-18 DIAGNOSIS — Z13.220 SCREENING, LIPID: ICD-10-CM

## 2024-11-18 DIAGNOSIS — Z78.9 TRANSITION OF CARE: ICD-10-CM

## 2024-11-18 DIAGNOSIS — J02.0 STREP PHARYNGITIS: Primary | ICD-10-CM

## 2024-11-18 PROCEDURE — 99495 TRANSJ CARE MGMT MOD F2F 14D: CPT | Performed by: FAMILY MEDICINE

## 2024-11-19 LAB
ATRIAL RATE: 102 BPM
P AXIS: 50 DEGREES
PR INTERVAL: 136 MS
QRS AXIS: 12 DEGREES
QRSD INTERVAL: 84 MS
QT INTERVAL: 346 MS
QTC INTERVAL: 451 MS
T WAVE AXIS: 30 DEGREES
VENTRICULAR RATE: 102 BPM

## 2024-11-19 PROCEDURE — 93010 ELECTROCARDIOGRAM REPORT: CPT | Performed by: INTERNAL MEDICINE

## 2025-06-17 ENCOUNTER — OFFICE VISIT (OUTPATIENT)
Dept: URGENT CARE | Facility: CLINIC | Age: 40
End: 2025-06-17
Payer: COMMERCIAL

## 2025-06-17 VITALS
SYSTOLIC BLOOD PRESSURE: 126 MMHG | DIASTOLIC BLOOD PRESSURE: 82 MMHG | TEMPERATURE: 98.4 F | HEART RATE: 99 BPM | OXYGEN SATURATION: 99 % | RESPIRATION RATE: 16 BRPM

## 2025-06-17 DIAGNOSIS — B96.89 ACUTE BACTERIAL TONSILLITIS: Primary | ICD-10-CM

## 2025-06-17 DIAGNOSIS — J03.80 ACUTE BACTERIAL TONSILLITIS: Primary | ICD-10-CM

## 2025-06-17 PROCEDURE — 99213 OFFICE O/P EST LOW 20 MIN: CPT | Performed by: PHYSICIAN ASSISTANT

## 2025-06-17 RX ORDER — AMOXICILLIN 400 MG/5ML
500 POWDER, FOR SUSPENSION ORAL 2 TIMES DAILY
Qty: 126 ML | Refills: 0 | Status: SHIPPED | OUTPATIENT
Start: 2025-06-17 | End: 2025-06-27

## 2025-06-17 NOTE — PROGRESS NOTES
[facility]        NAME: Holli Driver is a 39 y.o. female  : 1985    MRN: [id number]  DATE: 2025  TIME: 1:40 PM    Assessment and Plan   Acute bacterial tonsillitis [J03.80, B96.89]  1. Acute bacterial tonsillitis  amoxicillin (AMOXIL) 400 MG/5ML suspension            Patient Instructions     Take medicine as prescribed  Follow up with PCP in 3-5 days.  Proceed to  ER if symptoms worsen.    If tests have been performed at [facility], our office will contact you with results if changes need to be made to the care plan discussed with you at the visit.  [facility]    Chief Complaint     Chief Complaint   Patient presents with    Fever     Started with fever yesterday, ear pain, body aches, and sore throat, taking motrin. States she cannot swallow pills rx needs to be liquid.          History of Present Illness       Sore Throat   This is a new problem. The current episode started yesterday. The problem has been gradually worsening. Neither side of throat is experiencing more pain than the other. The maximum temperature recorded prior to her arrival was 103 - 104 F. The fever has been present for 1 to 2 days. The pain is at a severity of 7/10. The pain is mild. Associated symptoms include ear pain and trouble swallowing. Pertinent negatives include no diarrhea, drooling, ear discharge, hoarse voice, plugged ear sensation, shortness of breath, stridor or vomiting. She has tried NSAIDs for the symptoms.       Review of Systems   Review of Systems   Constitutional:  Positive for fever.   HENT:  Positive for ear pain, sore throat and trouble swallowing. Negative for drooling, ear discharge and hoarse voice.    Respiratory:  Negative for shortness of breath and stridor.    Gastrointestinal:  Negative for diarrhea and vomiting.         Current Medications     Current Medications[1]    Current Allergies     Allergies as of 2025    (No  Known Allergies)            The following portions of the patient's history were reviewed and updated as appropriate: allergies, current medications, past family history, past medical history, past social history, past surgical history and problem list.     Past Medical History[2]    Past Surgical History[3]    Family History[4]      Medications have been verified.        Objective   /82   Pulse 99   Temp 98.4 °F (36.9 °C)   Resp 16   SpO2 99%   No LMP recorded.       Physical Exam     Physical Exam  Constitutional:       Appearance: Normal appearance. She is well-developed.   HENT:      Head: Normocephalic.      Right Ear: Hearing, tympanic membrane, ear canal and external ear normal. There is no impacted cerumen.      Left Ear: Hearing, tympanic membrane, ear canal and external ear normal. There is no impacted cerumen.      Nose: Nose normal. No mucosal edema, congestion or rhinorrhea.      Mouth/Throat:      Pharynx: Oropharyngeal exudate and posterior oropharyngeal erythema present.      Tonsils: Tonsillar exudate present. 2+ on the right. 2+ on the left.     Cardiovascular:      Rate and Rhythm: Normal rate and regular rhythm.      Heart sounds: Normal heart sounds. No murmur heard.     No friction rub. No gallop.   Pulmonary:      Effort: Pulmonary effort is normal. No respiratory distress.      Breath sounds: Normal breath sounds. No stridor. No decreased breath sounds, wheezing, rhonchi or rales.   Abdominal:      General: Bowel sounds are normal. There is no distension.      Palpations: Abdomen is soft. There is no mass.      Tenderness: There is no abdominal tenderness. There is no guarding or rebound.   Lymphadenopathy:      Cervical: No cervical adenopathy.      Right cervical: No superficial cervical adenopathy.     Left cervical: No superficial cervical adenopathy.     Neurological:      Mental Status: She is alert.                        [1]   Current Outpatient Medications:     amoxicillin  (AMOXIL) 400 MG/5ML suspension, Take 6.3 mL (500 mg total) by mouth 2 (two) times a day for 10 days, Disp: 126 mL, Rfl: 0    loratadine (CLARITIN) 10 mg tablet, Take 10 mg by mouth daily, Disp: , Rfl:   [2]   Past Medical History:  Diagnosis Date    Allergic     Sepsis (HCC) 2024    Strep pharyngitis 2024    Varicella    [3]   Past Surgical History:  Procedure Laterality Date    APPENDECTOMY  2006     SECTION      WISDOM TOOTH EXTRACTION     [4]   Family History  Problem Relation Name Age of Onset    Thyroid disease Mother Chelsea     No Known Problems Father      Autoimmune disease Family      Thyroid disease Family      Thyroid disease Sister Isatu

## 2025-07-21 ENCOUNTER — APPOINTMENT (OUTPATIENT)
Dept: LAB | Facility: CLINIC | Age: 40
End: 2025-07-21
Payer: COMMERCIAL

## 2025-07-21 DIAGNOSIS — R41.89 BRAIN FOG: ICD-10-CM

## 2025-07-21 DIAGNOSIS — M25.50 ARTHRALGIA, UNSPECIFIED JOINT: ICD-10-CM

## 2025-07-21 DIAGNOSIS — R53.83 OTHER FATIGUE: ICD-10-CM

## 2025-07-21 DIAGNOSIS — Z13.220 SCREENING, LIPID: ICD-10-CM

## 2025-07-21 LAB
25(OH)D3 SERPL-MCNC: 22.9 NG/ML (ref 30–100)
CHOLEST SERPL-MCNC: 136 MG/DL (ref ?–200)
CRP SERPL QL: 5.3 MG/L
ERYTHROCYTE [SEDIMENTATION RATE] IN BLOOD: 9 MM/HOUR (ref 0–19)
FERRITIN SERPL-MCNC: 18 NG/ML (ref 30–307)
HDLC SERPL-MCNC: 51 MG/DL
IRON SATN MFR SERPL: 21 % (ref 15–50)
IRON SERPL-MCNC: 81 UG/DL (ref 50–212)
LDLC SERPL CALC-MCNC: 66 MG/DL (ref 0–100)
NONHDLC SERPL-MCNC: 85 MG/DL
RHEUMATOID FACT SERPL-ACNC: <10 IU/ML
T4 FREE SERPL-MCNC: 0.98 NG/DL (ref 0.61–1.12)
TIBC SERPL-MCNC: 379.4 UG/DL (ref 250–450)
TRANSFERRIN SERPL-MCNC: 271 MG/DL (ref 203–362)
TRIGL SERPL-MCNC: 96 MG/DL (ref ?–150)
TSH SERPL DL<=0.05 MIU/L-ACNC: 6.26 UIU/ML (ref 0.45–4.5)
UIBC SERPL-MCNC: 298 UG/DL (ref 155–355)
VIT B12 SERPL-MCNC: 446 PG/ML (ref 180–914)

## 2025-07-21 PROCEDURE — 80061 LIPID PANEL: CPT

## 2025-07-21 PROCEDURE — 86225 DNA ANTIBODY NATIVE: CPT

## 2025-07-21 PROCEDURE — 84443 ASSAY THYROID STIM HORMONE: CPT

## 2025-07-21 PROCEDURE — 82306 VITAMIN D 25 HYDROXY: CPT

## 2025-07-21 PROCEDURE — 36415 COLL VENOUS BLD VENIPUNCTURE: CPT

## 2025-07-21 PROCEDURE — 83550 IRON BINDING TEST: CPT

## 2025-07-21 PROCEDURE — 83540 ASSAY OF IRON: CPT

## 2025-07-21 PROCEDURE — 86431 RHEUMATOID FACTOR QUANT: CPT

## 2025-07-21 PROCEDURE — 86140 C-REACTIVE PROTEIN: CPT

## 2025-07-21 PROCEDURE — 84439 ASSAY OF FREE THYROXINE: CPT

## 2025-07-21 PROCEDURE — 85652 RBC SED RATE AUTOMATED: CPT

## 2025-07-21 PROCEDURE — 82728 ASSAY OF FERRITIN: CPT

## 2025-07-21 PROCEDURE — 82607 VITAMIN B-12: CPT

## 2025-07-21 PROCEDURE — 86038 ANTINUCLEAR ANTIBODIES: CPT

## 2025-07-23 ENCOUNTER — OFFICE VISIT (OUTPATIENT)
Dept: FAMILY MEDICINE CLINIC | Facility: CLINIC | Age: 40
End: 2025-07-23
Payer: COMMERCIAL

## 2025-07-23 VITALS
OXYGEN SATURATION: 98 % | BODY MASS INDEX: 36 KG/M2 | TEMPERATURE: 98 F | HEART RATE: 72 BPM | HEIGHT: 66 IN | WEIGHT: 224 LBS | DIASTOLIC BLOOD PRESSURE: 62 MMHG | SYSTOLIC BLOOD PRESSURE: 112 MMHG

## 2025-07-23 DIAGNOSIS — M79.89 LEFT AXILLARY SWELLING: ICD-10-CM

## 2025-07-23 DIAGNOSIS — R79.89 ABNORMAL TSH: ICD-10-CM

## 2025-07-23 DIAGNOSIS — R00.2 PALPITATIONS: Primary | ICD-10-CM

## 2025-07-23 DIAGNOSIS — E66.09 CLASS 2 OBESITY DUE TO EXCESS CALORIES WITHOUT SERIOUS COMORBIDITY WITH BODY MASS INDEX (BMI) OF 36.0 TO 36.9 IN ADULT: ICD-10-CM

## 2025-07-23 DIAGNOSIS — R79.0 LOW FERRITIN: ICD-10-CM

## 2025-07-23 DIAGNOSIS — R19.5 CHANGE IN STOOL: ICD-10-CM

## 2025-07-23 DIAGNOSIS — E66.812 CLASS 2 OBESITY DUE TO EXCESS CALORIES WITHOUT SERIOUS COMORBIDITY WITH BODY MASS INDEX (BMI) OF 36.0 TO 36.9 IN ADULT: ICD-10-CM

## 2025-07-23 DIAGNOSIS — E55.9 VITAMIN D DEFICIENCY: ICD-10-CM

## 2025-07-23 DIAGNOSIS — Z00.00 HEALTHCARE MAINTENANCE: ICD-10-CM

## 2025-07-23 PROCEDURE — 99395 PREV VISIT EST AGE 18-39: CPT | Performed by: FAMILY MEDICINE

## 2025-07-23 PROCEDURE — 99214 OFFICE O/P EST MOD 30 MIN: CPT | Performed by: FAMILY MEDICINE

## 2025-07-23 NOTE — PROGRESS NOTES
Name: Holli Driver      : 1985      MRN: [id number]  Encounter Provider: Amrita Solomon DO  Encounter Date: 2025   Encounter department: [facility]  :  Assessment & Plan  Palpitations  Holter placed to further evaluate        Low ferritin  Vitamin D deficiency  Will start supplement -- pt struggles to swallow pills, is going to look for a gummy (dose provided)         Abnormal TSH  Reviewed current pattern either subclinical hypothyroid or just fluctuation. Will repeat in 4 weeks to monitor for return to normal     Orders:  •  TSH, 3rd generation with Free T4 reflex; Future    Class 2 obesity due to excess calories without serious comorbidity with body mass index (BMI) of 36.0 to 36.9 in adult  Reviewed GLP mechanism of action and possible ADRs including abdominal bloating, nausea/GERD/belching, constipation.  No personal history of pancreatitis, thyroid CA and no family history of MEN syndrome. Discussed need for prior auth. Reviewed need for continued lifestyle modifications, focusing on regular physical activity (at least 150 minutes of moderate intensity exercise per week), maintaining a calorie deficit with plenty of protein and fiber intake, and good hydration.  Pt is going to think about this, but would like to wait for repeat thyroid testing as above.              Change in stool  Given persistence without obvious trigger, will refer to GI for further evaluation   Orders:  •  Ambulatory referral to Gastroenterology; Future    Left axillary swelling  Likely physiologic, but given location, will check US to r/o alternative etiology   Orders:  •  US Breast Axilla Left; Future    Healthcare maintenance  BP WNL   Labs UTD as below   Pap UTD  Encouraged regular physical activity, varied diet, and regular dental/eye exams                 History of Present Illness   HPI    Pt presents for lab review, annual physical     D 22, B12 446  TSH elevated/T4 WNL  Iron  "81, Ferritin 18   CRP 5.3 (from 6.6), ESR WNL; RF (-)   Lipids: Total 136, LDL 66, HDL 51, TG 96      Review of Systems   Constitutional:  Positive for fatigue. Negative for unexpected weight change (since 01/2025, has been trying to focus protein intake, eating plenty of fruits/veggies, managing portions; has also been working with a /monitoring her steps).   HENT:  Negative for congestion, ear pain, rhinorrhea and sore throat.         Had strep recently    Eyes:  Positive for itching (and dry) and visual disturbance ((+) blurred).   Respiratory:  Negative for cough and shortness of breath.    Cardiovascular:  Positive for palpitations (Having episodes of feeling \"a rush of adrenaline\", also having fluttery palpitations -- symptoms happening multiple times per week). Negative for chest pain and leg swelling.   Gastrointestinal:  Positive for diarrhea (has been trying to figure out any food connections, but hasn't noted any specific triggers -- has been for a few months, daily). Negative for abdominal pain, blood in stool and constipation.   Endocrine: Negative for polyuria.   Genitourinary:  Positive for menstrual problem (\"it's shifting\" -- still lasts a normal amount of time, but spreading further apart; notes she feels \"wiped\" after her menses and has severe emotional changes). Negative for dysuria and hematuria.   Neurological:  Negative for dizziness and headaches.   Psychiatric/Behavioral:  Negative for sleep disturbance.        Objective   /62 (Cuff Size: Large)   Pulse 72   Temp 98 °F (36.7 °C)   Ht 5' 6\" (1.676 m)   Wt 102 kg (224 lb)   LMP 07/17/2025   SpO2 98%   BMI 36.15 kg/m²      Physical Exam  Vitals and nursing note reviewed.   Constitutional:       General: She is not in acute distress.     Appearance: She is well-developed.   HENT:      Head: Normocephalic and atraumatic.      Right Ear: Tympanic membrane, ear canal and external ear normal.      Left Ear: Tympanic " membrane, ear canal and external ear normal.      Nose: Nose normal. No rhinorrhea.      Mouth/Throat:      Mouth: Mucous membranes are moist.      Pharynx: No oropharyngeal exudate or posterior oropharyngeal erythema.     Eyes:      Conjunctiva/sclera: Conjunctivae normal.       Cardiovascular:      Rate and Rhythm: Normal rate and regular rhythm.   Pulmonary:      Effort: Pulmonary effort is normal. No respiratory distress.      Breath sounds: Normal breath sounds.   Chest:     Abdominal:      General: Bowel sounds are normal. There is no distension.      Palpations: Abdomen is soft.      Tenderness: There is no abdominal tenderness.     Musculoskeletal:      Right lower leg: No edema.      Left lower leg: No edema.   Lymphadenopathy:      Cervical: No cervical adenopathy.     Skin:     General: Skin is warm and dry.     Neurological:      Mental Status: She is alert.      Comments: Grossly intact   Psychiatric:         Mood and Affect: Mood normal.

## 2025-07-23 NOTE — ASSESSMENT & PLAN NOTE
Will start supplement -- pt struggles to swallow pills, is going to look for a gummy (dose provided)

## 2025-07-23 NOTE — ASSESSMENT & PLAN NOTE
Reviewed GLP mechanism of action and possible ADRs including abdominal bloating, nausea/GERD/belching, constipation.  No personal history of pancreatitis, thyroid CA and no family history of MEN syndrome. Discussed need for prior auth. Reviewed need for continued lifestyle modifications, focusing on regular physical activity (at least 150 minutes of moderate intensity exercise per week), maintaining a calorie deficit with plenty of protein and fiber intake, and good hydration.  Pt is going to think about this, but would like to wait for repeat thyroid testing as above.

## 2025-07-26 LAB
DSDNA IGG SERPL IA-ACNC: 1.9 IU/ML (ref ?–15)
NUCLEAR IGG SER IA-RTO: 0.3 RATIO (ref ?–1)

## 2025-08-04 ENCOUNTER — TELEPHONE (OUTPATIENT)
Dept: FAMILY MEDICINE CLINIC | Facility: CLINIC | Age: 40
End: 2025-08-04